# Patient Record
Sex: MALE | Race: OTHER
[De-identification: names, ages, dates, MRNs, and addresses within clinical notes are randomized per-mention and may not be internally consistent; named-entity substitution may affect disease eponyms.]

---

## 2017-03-20 ENCOUNTER — HOSPITAL ENCOUNTER (EMERGENCY)
Dept: HOSPITAL 62 - ER | Age: 82
Discharge: HOME | End: 2017-03-20
Payer: MEDICARE

## 2017-03-20 VITALS — SYSTOLIC BLOOD PRESSURE: 148 MMHG | DIASTOLIC BLOOD PRESSURE: 58 MMHG

## 2017-03-20 DIAGNOSIS — M79.603: ICD-10-CM

## 2017-03-20 DIAGNOSIS — M25.559: ICD-10-CM

## 2017-03-20 DIAGNOSIS — K56.41: Primary | ICD-10-CM

## 2017-03-20 DIAGNOSIS — N18.4: ICD-10-CM

## 2017-03-20 DIAGNOSIS — K59.00: ICD-10-CM

## 2017-03-20 LAB
ALBUMIN SERPL-MCNC: 4.2 G/DL (ref 3.5–5)
ALP SERPL-CCNC: 103 U/L (ref 38–126)
ALT SERPL-CCNC: 27 U/L (ref 21–72)
ANION GAP SERPL CALC-SCNC: 25 MMOL/L (ref 5–19)
AST SERPL-CCNC: 22 U/L (ref 17–59)
BASOPHILS # BLD AUTO: 0.1 10^3/UL (ref 0–0.2)
BASOPHILS NFR BLD AUTO: 0.6 % (ref 0–2)
BILIRUB DIRECT SERPL-MCNC: 0.6 MG/DL (ref 0–0.4)
BILIRUB SERPL-MCNC: 0.6 MG/DL (ref 0.2–1.3)
BUN SERPL-MCNC: 145 MG/DL (ref 7–20)
CALCIUM: 8 MG/DL (ref 8.4–10.2)
CHLORIDE SERPL-SCNC: 96 MMOL/L (ref 98–107)
CO2 SERPL-SCNC: 18 MMOL/L (ref 22–30)
CREAT SERPL-MCNC: 13.28 MG/DL (ref 0.52–1.25)
EOSINOPHIL # BLD AUTO: 0 10^3/UL (ref 0–0.6)
EOSINOPHIL NFR BLD AUTO: 0.4 % (ref 0–6)
ERYTHROCYTE [DISTWIDTH] IN BLOOD BY AUTOMATED COUNT: 14 % (ref 11.5–14)
GLUCOSE SERPL-MCNC: 284 MG/DL (ref 75–110)
HCT VFR BLD CALC: 35.3 % (ref 37.9–51)
HGB BLD-MCNC: 11.9 G/DL (ref 13.5–17)
HGB HCT DIFFERENCE: 0.4
LIPASE SERPL-CCNC: 1484.3 U/L (ref 23–300)
LYMPHOCYTES # BLD AUTO: 0.8 10^3/UL (ref 0.5–4.7)
LYMPHOCYTES NFR BLD AUTO: 6.1 % (ref 13–45)
MCH RBC QN AUTO: 31 PG (ref 27–33.4)
MCHC RBC AUTO-ENTMCNC: 33.5 G/DL (ref 32–36)
MCV RBC AUTO: 92 FL (ref 80–97)
MONOCYTES # BLD AUTO: 1 10^3/UL (ref 0.1–1.4)
MONOCYTES NFR BLD AUTO: 7 % (ref 3–13)
NEUTROPHILS # BLD AUTO: 11.7 10^3/UL (ref 1.7–8.2)
NEUTS SEG NFR BLD AUTO: 85.9 % (ref 42–78)
POTASSIUM SERPL-SCNC: 5.6 MMOL/L (ref 3.6–5)
PROT SERPL-MCNC: 7.2 G/DL (ref 6.3–8.2)
RBC # BLD AUTO: 3.82 10^6/UL (ref 4.35–5.55)
SODIUM SERPL-SCNC: 138.6 MMOL/L (ref 137–145)
WBC # BLD AUTO: 13.6 10^3/UL (ref 4–10.5)

## 2017-03-20 PROCEDURE — 83690 ASSAY OF LIPASE: CPT

## 2017-03-20 PROCEDURE — 74022 RADEX COMPL AQT ABD SERIES: CPT

## 2017-03-20 PROCEDURE — 80053 COMPREHEN METABOLIC PANEL: CPT

## 2017-03-20 PROCEDURE — 85025 COMPLETE CBC W/AUTO DIFF WBC: CPT

## 2017-03-20 PROCEDURE — 36415 COLL VENOUS BLD VENIPUNCTURE: CPT

## 2017-03-20 PROCEDURE — 99284 EMERGENCY DEPT VISIT MOD MDM: CPT

## 2017-03-20 NOTE — ER DOCUMENT REPORT
ED General





- General


Chief Complaint: Constipation


Stated Complaint: ARM PAIN, HIP PAIN


Mode of Arrival: Medic


Information source: Patient


Notes: 


88 y old male hx of ckd, presents with complaints of constipation , decreased 

urine out put since december. Pt notes that he is on stool softner


TRAVEL OUTSIDE OF THE [place] IN LAST 30 DAYS: No





- HPI


Onset: Other - 3 month duration


Onset/Duration: Persistent


Quality of pain: Achy


Severity: Mild


Pain Level: 1


Associated symptoms: Other


Exacerbated by: Denies


Relieved by: Denies


Similar symptoms previously: Yes


Recently seen / treated by doctor: Yes





- Related Data


Allergies/Adverse Reactions: 


 





No Known Allergies Allergy (Verified 03/20/17 09:42)


 











Past Medical History





- Social History


Smoking Status: Never Smoker


Cigarette use (# per day): No


Chew tobacco use (# tins/day): No


Smoking Education Provided: No


Frequency of alcohol use: None


Drug Abuse: None


Family History: Reviewed & Not Pertinent


Patient has suicidal ideation: No


Patient has homicidal ideation: No





- Past Medical History


Cardiac Medical History: Reports: Hx Congestive Heart Failure, Hx 

Hypercholesterolemia, Hx Hypertension


   Denies: Hx Heart Attack


Pulmonary Medical History: 


   Denies: Hx Asthma, Hx Tuberculosis


Neurological Medical History: Denies: Hx Cerebrovascular Accident, Hx Seizures


Endocrine Medical History: Reports: Hx Diabetes Mellitus Type 2


Renal/ Medical History: Reports: Hx End Stage Renal Disease.  Denies: Hx 

Peritoneal Dialysis


GI Medical History: Denies: Hx Hepatitis, Hx Hiatal Hernia, Hx Ulcer


Infectious Medical History: Denies: Hx Hepatitis


Past Surgical History: Reports: Hx Oral Surgery - left jaw, Hx Orthopedic 

Surgery - Jaw.  Denies: Hx Open Heart Surgery, Hx Pacemaker





- Immunizations


Hx Diphtheria, Pertussis, Tetanus Vaccination: No


Hx Pneumococcal Vaccination: 01/01/11





Review of Systems





- Review of Systems


Notes: 


REVIEW OF SYSTEMS:


CONSTITUTIONAL :  Denies fever,  chills, or sweats.  Denies recent illness.


EENT:   Denies eye, ear, throat, or mouth pain or symptoms.  Denies nasal or 

sinus congestion or discharge.  Denies throat, tongue, or mouth swelling or 

difficulty swallowing.


CARDIOVASCULAR:  Denies chest pain.  Denies palpitations or racing or irregular 

heart beat.  Denies ankle edema.


RESPIRATORY:  Denies cough, cold, or chest congestion.  Denies shortness of 

breath, difficulty breathing, or wheezing.


GASTROINTESTINAL: Admits constipation


GENITOURINARY: Admits to decreased urinary output


MUSCULOSKELETAL:  Denies back or neck pain or stiffness.  Denies joint pain or 

swelling.


SKIN:   Denies rash, lesions or sores.


HEMATOLOGIC :   Denies easy bruising or bleeding.


LYMPHATIC:  Denies swollen, enlarged glands.


NEUROLOGICAL:  Denies confusion or altered mental status.  Denies passing out 

or loss of consciousness.  Denies dizziness or lightheadedness.  Denies 

headache.  Denies weakness or paralysis or loss of use of either side.  Denies 

problems with gait or speech.  Denies sensory loss, numbness, or tingling.  

Denies seizures.


PSYCHIATRIC:  Denies anxiety or stress.  Denies depression, suicidal ideation, 

or homicidal ideation.





ALL OTHER SYSTEMS REVIEWED AND NEGATIVE.





Dictation was performed using Dragon voice recognition software 








PHYSICAL EXAMINATION:





GENERAL: Well-appearing, well-nourished and in no acute distress.





HEAD: Atraumatic, normocephalic.





EYES: Pupils equal round and reactive to light, extraocular movements intact, 

sclera anicteric, conjunctiva are normal.





ENT: Nares patent, oropharynx clear without exudates.  Moist mucous membranes.





NECK: Normal range of motion, supple without lymphadenopathy





LUNGS: Breath sounds clear to auscultation bilaterally and equal.  No wheezes 

rales or rhonchi.





HEART: Regular rate and rhythm without murmurs





ABDOMEN: Soft, nontender, nondistended abdomen.  No guarding, no rebound.  No 

masses appreciated.  Very hard stool in rectal vault





Musculoskeletal: Normal range of motion, no pitting or edema.  No cyanosis.





NEUROLOGICAL: Cranial nerves grossly intact.  Normal speech, normal gait.  

Normal sensory, motor exams 





PSYCH: Normal mood, normal affect.





SKIN: Warm, Dry, normal turgor, no rashes or lesions noted.





Physical Exam





- Vital signs


Vitals: 


 











Temp Pulse Resp BP Pulse Ox


 


 98.0 F   65   16   119/50 L  100 


 


 03/20/17 09:36  03/20/17 09:36  03/20/17 09:36  03/20/17 09:36  03/20/17 09:36














Course





- Re-evaluation


Re-evalutation: 


03/20/17 13:06


Patient's nephrologist paged





03/20/17 14:49


Dr kassy pérez patient is stable ot follow up in his office for outpatient 

dialysis. he requests kyaxelate for the kyperkalemia





Fecal decompaction performed, large hard stool removed


pt to be discharged with close follow up 





03/20/17 14:52


After performing a Medical Screening Examination, I estimate there is LOW risk 

for ACUTE APPENDICITIS, BOWEL OBSTRUCTION, ACUTE CHOLECYSTITIS, PERFORATED 

DIVERTICULITIS, INCARCERATED HERNIA, PANCREATITIS, or PERFORATED ULCER, thus I 

consider the discharge disposition reasonable. Also, there is no evidence or 

peritonitis, sepsis, or toxicity. The patient and I have discussed the 

diagnosis and risks, and we agree with discharging home with close follow-up 

with the understanding that symptoms and presentations can change. We also 

discussed returning to the Emergency Department immediately if new or worsening 

symptoms occur. We have discussed the symptoms which are most concerning (e.g., 

bloody stool, fever, changing or worsening pain, intractable vomiting - 

standard verbal up date) that necessitate immediate return.





- Vital Signs


Vital signs: 


 











Temp Pulse Resp BP Pulse Ox


 


 98.0 F   65   16   119/50 L  100 


 


 03/20/17 09:36  03/20/17 09:36  03/20/17 09:36  03/20/17 09:36  03/20/17 09:36














- Laboratory


Result Diagrams: 


 03/20/17 11:30





 03/20/17 11:30


Laboratory results interpreted by me: 


 











  03/20/17 03/20/17





  11:30 11:30


 


WBC  13.6 H 


 


RBC  3.82 L 


 


Hgb  11.9 L 


 


Hct  35.3 L 


 


Seg Neutrophils %  85.9 H 


 


Lymphocytes %  6.1 L 


 


Absolute Neutrophils  11.7 H 


 


Potassium   5.6 H


 


Chloride   96 L


 


Carbon Dioxide   18 L


 


Anion Gap   25 H


 


BUN   145 H


 


Creatinine   13.28 H


 


Est GFR ( Amer)   4 L


 


Est GFR (Non-Af Amer)   4 L


 


Glucose   284 H


 


Calcium   8.0 L


 


Direct Bilirubin   0.6 H


 


Neonat Total Bilirubin   0.0 L


 


Lipase   1484.3 H














- Diagnostic Test


Radiology reviewed: Image reviewed, Reports reviewed





Procedures





- Additional Procedures


  ** fecal decompaction


Time performed: 14:35 - large amount of hard stool removed with no complication 





Discharge





- Discharge


Clinical Impression: 


 Fecal impaction





Constipation


Qualifiers:


 Constipation type: unspecified constipation type Qualified Code(s): K59.00 - 

Constipation, unspecified





CKD (chronic kidney disease)


Qualifiers:


 Chronic kidney disease stage: stage 4 (severe) Qualified Code(s): N18.4 - 

Chronic kidney disease, stage 4 (severe)





Condition: Stable


Disposition: HOME, SELF-CARE


Instructions:  Fecal Impaction ([facility])


Prescriptions: 


Sodium Polystyrene Sulfonate [Kayexalate 15 Gm/60 Ml Susp 60 Ml] 15 gm PO BID #

1 bottle


Referrals: 


LEANDRO SU MD [Primary Care Provider] - Follow up as needed


LUZ MARINA GARCIA MD [ACTIVE STAFF] - Follow up tomorrow

## 2017-03-31 ENCOUNTER — HOSPITAL ENCOUNTER (OUTPATIENT)
Dept: HOSPITAL 62 - OD | Age: 82
End: 2017-03-31
Attending: FAMILY MEDICINE
Payer: MEDICARE

## 2017-03-31 DIAGNOSIS — N18.9: Primary | ICD-10-CM

## 2017-03-31 LAB
ANION GAP SERPL CALC-SCNC: 21 MMOL/L (ref 5–19)
BASOPHILS # BLD AUTO: 0.1 10^3/UL (ref 0–0.2)
BASOPHILS NFR BLD AUTO: 1 % (ref 0–2)
BUN SERPL-MCNC: 129 MG/DL (ref 7–20)
CALCIUM: 8.3 MG/DL (ref 8.4–10.2)
CHLORIDE SERPL-SCNC: 102 MMOL/L (ref 98–107)
CO2 SERPL-SCNC: 20 MMOL/L (ref 22–30)
CREAT SERPL-MCNC: 9.35 MG/DL (ref 0.52–1.25)
EOSINOPHIL # BLD AUTO: 0.2 10^3/UL (ref 0–0.6)
EOSINOPHIL NFR BLD AUTO: 2 % (ref 0–6)
ERYTHROCYTE [DISTWIDTH] IN BLOOD BY AUTOMATED COUNT: 13.7 % (ref 11.5–14)
GLUCOSE SERPL-MCNC: 148 MG/DL (ref 75–110)
HCT VFR BLD CALC: 29.1 % (ref 37.9–51)
HGB BLD-MCNC: 9.9 G/DL (ref 13.5–17)
HGB HCT DIFFERENCE: 0.6
LYMPHOCYTES # BLD AUTO: 1 10^3/UL (ref 0.5–4.7)
LYMPHOCYTES NFR BLD AUTO: 12.1 % (ref 13–45)
MCH RBC QN AUTO: 31.6 PG (ref 27–33.4)
MCHC RBC AUTO-ENTMCNC: 34 G/DL (ref 32–36)
MCV RBC AUTO: 93 FL (ref 80–97)
MONOCYTES # BLD AUTO: 0.8 10^3/UL (ref 0.1–1.4)
MONOCYTES NFR BLD AUTO: 9.2 % (ref 3–13)
NEUTROPHILS # BLD AUTO: 6.3 10^3/UL (ref 1.7–8.2)
NEUTS SEG NFR BLD AUTO: 75.7 % (ref 42–78)
POTASSIUM SERPL-SCNC: 3.9 MMOL/L (ref 3.6–5)
RBC # BLD AUTO: 3.13 10^6/UL (ref 4.35–5.55)
SODIUM SERPL-SCNC: 142.6 MMOL/L (ref 137–145)
WBC # BLD AUTO: 8.3 10^3/UL (ref 4–10.5)

## 2017-03-31 PROCEDURE — 85025 COMPLETE CBC W/AUTO DIFF WBC: CPT

## 2017-03-31 PROCEDURE — 36415 COLL VENOUS BLD VENIPUNCTURE: CPT

## 2017-03-31 PROCEDURE — 80048 BASIC METABOLIC PNL TOTAL CA: CPT

## 2017-04-03 ENCOUNTER — HOSPITAL ENCOUNTER (OUTPATIENT)
Dept: HOSPITAL 62 - OD | Age: 82
End: 2017-04-03
Attending: INTERNAL MEDICINE
Payer: MEDICARE

## 2017-04-03 DIAGNOSIS — N18.5: ICD-10-CM

## 2017-04-03 DIAGNOSIS — Z11.59: Primary | ICD-10-CM

## 2017-04-03 DIAGNOSIS — R80.9: ICD-10-CM

## 2017-04-03 DIAGNOSIS — E87.5: ICD-10-CM

## 2017-04-03 DIAGNOSIS — E11.9: ICD-10-CM

## 2017-04-03 LAB
ANION GAP SERPL CALC-SCNC: 20 MMOL/L (ref 5–19)
BUN SERPL-MCNC: 118 MG/DL (ref 7–20)
CALCIUM: 8.4 MG/DL (ref 8.4–10.2)
CHLORIDE SERPL-SCNC: 104 MMOL/L (ref 98–107)
CO2 SERPL-SCNC: 21 MMOL/L (ref 22–30)
CREAT SERPL-MCNC: 8.78 MG/DL (ref 0.52–1.25)
GLUCOSE SERPL-MCNC: 120 MG/DL (ref 75–110)
POTASSIUM SERPL-SCNC: 4 MMOL/L (ref 3.6–5)
SODIUM SERPL-SCNC: 145.1 MMOL/L (ref 137–145)

## 2017-04-03 PROCEDURE — 36415 COLL VENOUS BLD VENIPUNCTURE: CPT

## 2017-04-03 PROCEDURE — 80048 BASIC METABOLIC PNL TOTAL CA: CPT

## 2017-04-03 PROCEDURE — 86803 HEPATITIS C AB TEST: CPT

## 2017-04-03 PROCEDURE — 87340 HEPATITIS B SURFACE AG IA: CPT

## 2017-04-03 PROCEDURE — 86704 HEP B CORE ANTIBODY TOTAL: CPT

## 2017-04-03 PROCEDURE — 86804 HEP C AB TEST CONFIRM: CPT

## 2017-04-05 LAB — HCV AB SER IA-ACNC: <0.1 S/CO RATIO (ref 0–0.9)

## 2017-04-14 ENCOUNTER — HOSPITAL ENCOUNTER (OUTPATIENT)
Dept: HOSPITAL 62 - 5 | Age: 82
Setting detail: OBSERVATION
LOS: 1 days | Discharge: HOME | End: 2017-04-15
Attending: SURGERY | Admitting: SURGERY
Payer: MEDICARE

## 2017-04-14 DIAGNOSIS — E11.22: ICD-10-CM

## 2017-04-14 DIAGNOSIS — I13.2: ICD-10-CM

## 2017-04-14 DIAGNOSIS — T82.858A: ICD-10-CM

## 2017-04-14 DIAGNOSIS — Z99.2: ICD-10-CM

## 2017-04-14 DIAGNOSIS — N18.6: ICD-10-CM

## 2017-04-14 DIAGNOSIS — Z79.899: ICD-10-CM

## 2017-04-14 DIAGNOSIS — Z79.82: ICD-10-CM

## 2017-04-14 DIAGNOSIS — T82.818A: Primary | ICD-10-CM

## 2017-04-14 DIAGNOSIS — I50.9: ICD-10-CM

## 2017-04-14 LAB
ANION GAP SERPL CALC-SCNC: 13 MMOL/L (ref 5–19)
BASOPHILS # BLD AUTO: 0.1 10^3/UL (ref 0–0.2)
BASOPHILS NFR BLD AUTO: 0.9 % (ref 0–2)
BUN SERPL-MCNC: 72 MG/DL (ref 7–20)
CALCIUM: 7.4 MG/DL (ref 8.4–10.2)
CHLORIDE SERPL-SCNC: 105 MMOL/L (ref 98–107)
CO2 SERPL-SCNC: 25 MMOL/L (ref 22–30)
CREAT SERPL-MCNC: 5.75 MG/DL (ref 0.52–1.25)
EOSINOPHIL # BLD AUTO: 0.2 10^3/UL (ref 0–0.6)
EOSINOPHIL NFR BLD AUTO: 3.4 % (ref 0–6)
ERYTHROCYTE [DISTWIDTH] IN BLOOD BY AUTOMATED COUNT: 13.9 % (ref 11.5–14)
GLUCOSE SERPL-MCNC: 120 MG/DL (ref 75–110)
HCT VFR BLD CALC: 24.8 % (ref 37.9–51)
HGB BLD-MCNC: 8.4 G/DL (ref 13.5–17)
HGB HCT DIFFERENCE: 0.4
LYMPHOCYTES # BLD AUTO: 1 10^3/UL (ref 0.5–4.7)
LYMPHOCYTES NFR BLD AUTO: 16.2 % (ref 13–45)
MCH RBC QN AUTO: 32 PG (ref 27–33.4)
MCHC RBC AUTO-ENTMCNC: 33.9 G/DL (ref 32–36)
MCV RBC AUTO: 95 FL (ref 80–97)
MONOCYTES # BLD AUTO: 0.8 10^3/UL (ref 0.1–1.4)
MONOCYTES NFR BLD AUTO: 12.5 % (ref 3–13)
NEUTROPHILS # BLD AUTO: 4.3 10^3/UL (ref 1.7–8.2)
NEUTS SEG NFR BLD AUTO: 67 % (ref 42–78)
POTASSIUM SERPL-SCNC: 3.9 MMOL/L (ref 3.6–5)
RBC # BLD AUTO: 2.62 10^6/UL (ref 4.35–5.55)
SODIUM SERPL-SCNC: 143.4 MMOL/L (ref 137–145)
WBC # BLD AUTO: 6.4 10^3/UL (ref 4–10.5)

## 2017-04-14 PROCEDURE — C1752 CATH,HEMODIALYSIS,SHORT-TERM: HCPCS

## 2017-04-14 PROCEDURE — C1894 INTRO/SHEATH, NON-LASER: HCPCS

## 2017-04-14 PROCEDURE — 36558 INSERT TUNNELED CV CATH: CPT

## 2017-04-14 PROCEDURE — 73060 X-RAY EXAM OF HUMERUS: CPT

## 2017-04-14 PROCEDURE — 82962 GLUCOSE BLOOD TEST: CPT

## 2017-04-14 PROCEDURE — 76937 US GUIDE VASCULAR ACCESS: CPT

## 2017-04-14 PROCEDURE — 93005 ELECTROCARDIOGRAM TRACING: CPT

## 2017-04-14 PROCEDURE — 93010 ELECTROCARDIOGRAM REPORT: CPT

## 2017-04-14 PROCEDURE — 85025 COMPLETE CBC W/AUTO DIFF WBC: CPT

## 2017-04-14 PROCEDURE — 36415 COLL VENOUS BLD VENIPUNCTURE: CPT

## 2017-04-14 PROCEDURE — 36902 INTRO CATH DIALYSIS CIRCUIT: CPT

## 2017-04-14 PROCEDURE — 77001 FLUOROGUIDE FOR VEIN DEVICE: CPT

## 2017-04-14 PROCEDURE — 80048 BASIC METABOLIC PNL TOTAL CA: CPT

## 2017-04-14 PROCEDURE — C1769 GUIDE WIRE: HCPCS

## 2017-04-14 PROCEDURE — G0379 DIRECT REFER HOSPITAL OBSERV: HCPCS

## 2017-04-14 PROCEDURE — C1725 CATH, TRANSLUMIN NON-LASER: HCPCS

## 2017-04-14 PROCEDURE — 00532 ANES ACCESS CTR VENOUS CRCJ: CPT

## 2017-04-14 PROCEDURE — G0378 HOSPITAL OBSERVATION PER HR: HCPCS

## 2017-04-14 NOTE — PDOC H&P
General


Chief Complaint: 


The patient is admitted for reestablishment of hemodialysis access.  He has end-

stage renal disease on hemodialysis and his left arm brachiocephalic fistula 

has failed.





- Diagnosis


(1) Dialysis AV fistula malfunction


Is this a Current Diagnosis?: Yes





(2) Diabetes mellitus type II, controlled


Is this a Current Diagnosis?: Yes








(4) History of congestive heart failure


Is this a Current Diagnosis?: Yes





(5) Hypertension


Is this a Current Diagnosis?: Yes








- Current Medications/Allergies


Home Medications: 


 





Amlodipine Besylate [Norvasc 10 mg Tablet] 10 mg PO DAILY 01/02/13 


Doxazosin Mesylate [Cardura] 8 mg PO QHS 01/02/13 


Calcitriol [Rocaltrol 0.25 mcg Capsule] 0.25 mcg PO DAILY 04/22/14 


Glipizide [Glipizide Xl] 10 mg PO DAILY 04/22/14 


Folic Acid/Vitamin B Comp W-C [Nephrocaps Multiple Vitamin Capsule] 1 cap PO 

QHS 09/09/15 


Aspirin 325 mg PO DAILY 04/14/17 


Furosemide [Lasix] 40 mg PO DAILY 04/14/17 


Metoprolol Tartrate [Lopressor 50 mg Tablet] 50 mg PO QHS 04/14/17 








Allergies/Adverse Reactions: 


 





No Known Allergies Allergy (Verified 03/20/17 09:42)


 











Past Medical History


Cardiac Medical History: Reports: Congestive Heart Failure, Hyperlipidema, 

Hypertension


   Denies: Myocardial Infarction


Pulmonary Medical History: 


   Denies: Asthma, Tuberculosis


Neurological Medical History: 


   Denies: Seizures


Endocrine Medical History: Reports: Diabetes Mellitus Type 2


Renal/ Medical History: Reports: End Stage Renal Disease


GI Medical History: 


   Denies: Hepatitis, Hiatal Hernia


Hematology: 


   Denies: Anemia, Sickle Cell Disease





Past Surgical History


Past Surgical History: Reports: Orthopedic Surgery - Jaw


   Denies: Pacemaker





Family History


Family History: Reviewed & Not Pertinent


Parental Family History Reviewed: No


Children Family History Reviewed: No


Sibling(s) Family History Reviewed.: No





Social History


Smoking Status: Never Smoker


Frequency of Alcohol Use: None


Hx Recreational Drug Use: No


Drugs: None


Hx Prescription Drug Abuse: No





Physical Exam


Vital Signs: 


 











Temp Pulse Resp BP Pulse Ox


 


 98 F   66   20   167/62 H  100 


 


 04/14/17 17:16  04/14/17 17:16  04/14/17 17:16  04/14/17 17:16  04/14/17 17:16








 Intake & Output











 04/13/17 04/14/17 04/15/17





 06:59 06:59 06:59


 


Weight   72.1 kg











Additional comments: 


Constitutional: A well-developed well-nourished -American gentleman.  No 

acute distress.





Eyes: Mucous membranes  pink and moist, sclerae anicteric, pupils react 

normally.





Respiratory: No shortness of breath or wheezing.  Breath sounds are normal and 

equal.











Ear nose and throat: Hearing grossly normal.  Teeth intact, tongue normal.





Cardiac: Heart sounds normal, no murmurs, no increased JVP.  Peripheral edema.





Extremities: Upper extremities shows normal range of movement and pulses.  The 

left arm has a cephalic to brachial fistula which is barely palpable 

proximally.  No bruit.  Bruising in the arm noted





Psychiatric: judgment, memory, insight seem normal.  Mood is normal, 

appropriate and pleasant.





Impression/Plan


Impression: 


#1 malfunctioning, clotted arteriovenous fistula left brachiocephalic.





#2 end stage renal disease on hemodialysis.





#3 diabetes mellitus type II.





#4 history of congestive heart failure.





#5 hypertension.


Plan: 


In this patient who is dependent on hemodialysis reestablishment of access is 

important.  The plan is to insert a PermCath catheter and attempted to mature 

the existing fistula by angioplasty.  This would be done first thing in the 

morning with the hope of getting the patient to outpatient hemodialysis later 

on tomorrow.





The procedure was explained to the patient.  He is accepting of the risks, 

benefits, expected outcome, and alternatives.

## 2017-04-15 VITALS — DIASTOLIC BLOOD PRESSURE: 60 MMHG | SYSTOLIC BLOOD PRESSURE: 137 MMHG

## 2017-04-15 PROCEDURE — 03783ZZ DILATION OF LEFT BRACHIAL ARTERY, PERCUTANEOUS APPROACH: ICD-10-PCS | Performed by: SURGERY

## 2017-04-15 PROCEDURE — B244YZZ ULTRASONOGRAPHY OF RIGHT HEART USING OTHER CONTRAST: ICD-10-PCS | Performed by: SURGERY

## 2017-04-15 PROCEDURE — 02H633Z INSERTION OF INFUSION DEVICE INTO RIGHT ATRIUM, PERCUTANEOUS APPROACH: ICD-10-PCS | Performed by: SURGERY

## 2017-04-15 NOTE — OPERATIVE REPORT
Operative Report


DATE OF SURGERY: 04/15/17


PREOPERATIVE DIAGNOSIS: #1 clotted arteriovenous fistula left arm rate cephalic 

brachial.  #2 end-stage renal disease on hemodialysis.  #3 mellitus type II.  #

4 history of congestive heart failure.  #5 hypertension


POSTOPERATIVE DIAGNOSIS: #1 clotted arteriovenous fistula left arm rate 

cephalic brachial.  #2 end-stage renal disease on hemodialysis.  #3 mellitus 

type II.  #4 history of congestive heart failure.  #5 hypertension


OPERATION: #1 ultrasound evaluation of the right internal jugular vein.  #2 

insertion of permacatheter via right internal jugular vein under real-time 

ultrasound access.  #3 ultrasound guided needle access into left  arm 

arteriovenous fistula.  #4 angioplasty in central system, venous.  #5 

angioplasty in arteriovenous fistula.  #6 angiogram and interpretation


SURGEON: LENNOX WILLIAMS 1ST ASSISTANT: none


ANESTHESIA: LMAC


TISSUE REMOVED OR ALTERED: N/A.


COMPLICATIONS: 


None


ESTIMATED BLOOD LOSS: 20 mL.


INTRAOPERATIVE FINDINGS: Of a satisfactory right internal jugular vein to 

support permacatheter.  Perm catheter tip well down in the right atrial pool.  

Satisfactory flow of contrast through the right atrium, ventricle and pulmonary 

outflow tract.  Hardcopy documentation done.  The left arm AV fistula and 

pulsation for about 3 cm.  It appears to be a cephalic to brachial transposed.  

Completely clotted above that level.  Stenosis inferred at the subclavian to 

cephalic junction waist demonstrated an eliminated on angioplasty.  Estimated 

to be 80%, eliminated by angioplasty.  Several stenoses in the followed fistula 

as well as arm were addressed by angioplasty.  After successful angioplasty 

there was sweet flow of contrast through the fistula with apparent correction 

of the stenoses.  Estimated to be 70% stenosis initially, resolved by 

angioplasty.


PROCEDURE: 





PROCEDURE:


After verifying the procedure and having obtained informed consent, the


patient's left arm was prepared with Chlorhexidine and draped out with


sterile linen.  Local anesthesia infiltrated.  Percutaneous access into


the fistula ,[ antegrade], obtained about [2 cm] from the arteriovenous 

anastomosis using a


micro puncture needle followed by micro puncture wire and then a


micro puncture catheter.  This was facilitated by use of ultrasound guidance, 

real-time.  Angiogram demonstrated the aforementioned


findings.  Angioplasty was elected.  A 0.035 Glide wire was inserted, and


over this, a 6 Japanese short introducer was placed, this was followed by a [7-mm

] angioplasty balloon .  Angioplasty was


serially done from the upper fistula down to the introducer.  Hand injecting 

with a 3 mL syringe with an estimated 8-15 dulce pressure.  Held for 30 seconds 

to 3 minutes.]. 


Completion angiogram demonstrated [satisfactory result].  The instrumentation 

was now withdrawn over pressure for 10 minutes dressings applied, procedure 

concluded.








Flouro time: 4 minutes


Exposure: 19.5 MicroGray.





Contrast 50 mL of Isovue-M 300, low osmolality.





DICTATING PHYSICIAN:  LENNOX WILLIAMS, M.D.











cc: WILLIAMS, LENNOX M.D. ([id number])


>>

## 2017-04-15 NOTE — EKG REPORT
SEVERITY:- ABNORMAL ECG -

SINUS RHYTHM

MULTIPLE VENTRICULAR PREMATURE COMPLEXES

BORDERLINE LEFT AXIS DEVIATION

BORDERLINE PROLONGED QT INTERVAL

:

Confirmed by: Maverick Whitfield MD 15-Apr-2017 00:07:21

## 2017-04-15 NOTE — PDOC DISCHARGE SUMMARY
Discharge Summary (SDC)





- Discharge


Final Diagnosis: 


#1 clotted arteriovenous fistula left arm rate cephalic brachial.





#2 end-stage renal disease on hemodialysis.





#3 mellitus type II.





#4 history of congestive heart failure.





#5 hypertension.


Date of Surgery: 04/15/17


Discharge Date: 04/15/17


Condition: Fair


Treatment or Instructions: 


#1 activities within moderation encouraged.





#2 follow up in my office by appointment in about 1 week.  Call for appointment.





#3 the wounds covered clean and dry until office visit.





#4 hold off on school/work until evaluation in office.





#5 may shower in 48 hours, keep operated area as dry as possible.





#6 discharge from ambulatory  when ASU criteria met.  2 hemodialysis.





#7 medications per medication reconciliation sheet.





Discharge Diet: Other (Comments) - Renal diabetic


Respiratory Treatments at Home: Deep Breathing/Coughing


Discharge Activity: Activity As Tolerated


Report the Following to Your Physician Immediately: Shortness of Breath, 

Unusual Bleeding

## 2017-07-10 ENCOUNTER — HOSPITAL ENCOUNTER (EMERGENCY)
Dept: HOSPITAL 62 - ER | Age: 82
Discharge: ANOTHER HOSPITAL | End: 2017-07-10
Payer: MEDICARE

## 2017-07-10 VITALS — SYSTOLIC BLOOD PRESSURE: 129 MMHG | DIASTOLIC BLOOD PRESSURE: 51 MMHG

## 2017-07-10 DIAGNOSIS — M25.552: ICD-10-CM
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
## 2017-07-10 DIAGNOSIS — T82.898A: ICD-10-CM

## 2017-07-10 DIAGNOSIS — S06.5X9A: Primary | ICD-10-CM

## 2017-07-10 LAB
ALBUMIN SERPL-MCNC: 3.4 G/DL (ref 3.5–5)
ALP SERPL-CCNC: 74 U/L (ref 38–126)
ALT SERPL-CCNC: 21 U/L (ref 21–72)
ANION GAP SERPL CALC-SCNC: 16 MMOL/L (ref 5–19)
AST SERPL-CCNC: 24 U/L (ref 17–59)
BASOPHILS # BLD AUTO: 0.1 10^3/UL (ref 0–0.2)
BASOPHILS NFR BLD AUTO: 1.3 % (ref 0–2)
BILIRUB DIRECT SERPL-MCNC: 0.8 MG/DL (ref 0–0.4)
BILIRUB SERPL-MCNC: 0.8 MG/DL (ref 0.2–1.3)
BUN SERPL-MCNC: 54 MG/DL (ref 7–20)
CALCIUM: 7.3 MG/DL (ref 8.4–10.2)
CHLORIDE SERPL-SCNC: 105 MMOL/L (ref 98–107)
CO2 SERPL-SCNC: 23 MMOL/L (ref 22–30)
CREAT SERPL-MCNC: 7.62 MG/DL (ref 0.52–1.25)
EOSINOPHIL # BLD AUTO: 0.1 10^3/UL (ref 0–0.6)
EOSINOPHIL NFR BLD AUTO: 0.8 % (ref 0–6)
ERYTHROCYTE [DISTWIDTH] IN BLOOD BY AUTOMATED COUNT: 15.8 % (ref 11.5–14)
GLUCOSE SERPL-MCNC: 154 MG/DL (ref 75–110)
HCT VFR BLD CALC: 32.7 % (ref 37.9–51)
HGB BLD-MCNC: 10.6 G/DL (ref 13.5–17)
HGB HCT DIFFERENCE: -0.9
LYMPHOCYTES # BLD AUTO: 0.9 10^3/UL (ref 0.5–4.7)
LYMPHOCYTES NFR BLD AUTO: 12.1 % (ref 13–45)
MCH RBC QN AUTO: 31.9 PG (ref 27–33.4)
MCHC RBC AUTO-ENTMCNC: 32.4 G/DL (ref 32–36)
MCV RBC AUTO: 99 FL (ref 80–97)
MONOCYTES # BLD AUTO: 0.7 10^3/UL (ref 0.1–1.4)
MONOCYTES NFR BLD AUTO: 9.3 % (ref 3–13)
NEUTROPHILS # BLD AUTO: 5.8 10^3/UL (ref 1.7–8.2)
NEUTS SEG NFR BLD AUTO: 76.5 % (ref 42–78)
POTASSIUM SERPL-SCNC: 5.5 MMOL/L (ref 3.6–5)
PROT SERPL-MCNC: 6.2 G/DL (ref 6.3–8.2)
RBC # BLD AUTO: 3.31 10^6/UL (ref 4.35–5.55)
SODIUM SERPL-SCNC: 143.8 MMOL/L (ref 137–145)
WBC # BLD AUTO: 7.6 10^3/UL (ref 4–10.5)

## 2017-07-10 PROCEDURE — 70450 CT HEAD/BRAIN W/O DYE: CPT

## 2017-07-10 PROCEDURE — 36415 COLL VENOUS BLD VENIPUNCTURE: CPT

## 2017-07-10 PROCEDURE — 80053 COMPREHEN METABOLIC PANEL: CPT

## 2017-07-10 PROCEDURE — 85025 COMPLETE CBC W/AUTO DIFF WBC: CPT

## 2017-07-10 PROCEDURE — 70551 MRI BRAIN STEM W/O DYE: CPT

## 2017-07-10 PROCEDURE — 99291 CRITICAL CARE FIRST HOUR: CPT

## 2017-07-10 NOTE — RADIOLOGY REPORT (SQ)
EXAM DESCRIPTION:  HIP LEFT AP/LATERAL



COMPLETED DATE/TIME:  7/10/2017 10:48 am



REASON FOR STUDY:  left hip



COMPARISON:  Abdominal films 3/20/2017



NUMBER OF VIEWS:  Two views.



TECHNIQUE:  AP pelvis and additional frog-leg view of the left hip.



LIMITATIONS:  None.



FINDINGS:  MINERALIZATION: Osteoporotic

LEFT HIP: No fracture or dislocation.  No worrisome bone lesions.

RIGHT HIP: No fracture or dislocation.  No worrisome bone lesions.

PUBIS AND ISCHIUM: No fracture.

PELVIS: No fracture.  Benign osteochondroma left anterior inferior iliac spine

SACRUM: No fracture or dislocation. No worrisome bone lesions.

LOWER LUMBAR SPINE: Advanced disc space loss of height.

SOFT TISSUES: Very heavily calcified abdominal aorta and iliac vessels.

OTHER: No other significant finding.



IMPRESSION:  No acute displaced fracture.



TECHNICAL DOCUMENTATION:  JOB ID:  [id number]

 2011 [employer]- All Rights Reserved

## 2017-07-10 NOTE — ER DOCUMENT REPORT
ED General





- General


Chief Complaint: Hip Pain


Stated Complaint: LEFT SIDE PAIN


Time Seen by Provider: 07/10/17 10:15


Information source: Patient


Notes: 


88-year-old male who presents with his family with multiple complaints.  

Patient has had some pain to his left hip after a fall in December.  Patient 

had an unremarkable MRI of the hip in March 2017 according to the family.  

Patient states that the pain is in the lateral hip radiating down the back of 

his leg.  He denies any foot drop, incontinence, fevers, or obvious weakness.  

Patient is worse when he moves.  Patient went to dialysis on Thursday but when 

he went Saturday they told him that his left fistula was "clotted".  He has not 

received dialysis since this past Thursday.





Patient denies any shortness of breath or swelling of his legs.





Family also states for the last 3 weeks the patient has had some intermittent 

whole body "shaking".  The patient does not fall or lose consciousness with 

these episodes.  Patient is fully cognizant and speaking during these episodes.

  Patient denies any pain during these episodes.  No obvious weakness or 

numbness.  No headaches or neck pain.  No fevers or vomiting.


TRAVEL OUTSIDE OF THE [place] IN LAST 30 DAYS: No





- HPI


Onset: Other - See above


Onset/Duration: Gradual


Quality of pain: Achy


Severity: Moderate


Pain Level: Denies


Associated symptoms: Other - See above


Exacerbated by: Denies


Relieved by: Denies


Similar symptoms previously: Yes


Recently seen / treated by doctor: Yes





- Related Data


Allergies/Adverse Reactions: 


 





No Known Allergies Allergy (Verified 07/10/17 08:28)


 











Past Medical History





- General


Information source: Patient





- Social History


Smoking Status: Never Smoker


Cigarette use (# per day): No


Chew tobacco use (# tins/day): No


Smoking Education Provided: No


Frequency of alcohol use: None


Drug Abuse: None


Family History: Reviewed & Not Pertinent





- Past Medical History


Cardiac Medical History: Reports: Hx Congestive Heart Failure, Hx 

Hypercholesterolemia, Hx Hypertension


   Denies: Hx Heart Attack


Pulmonary Medical History: 


   Denies: Hx Asthma, Hx Tuberculosis


Neurological Medical History: Denies: Hx Cerebrovascular Accident, Hx Seizures


Endocrine Medical History: Reports: Hx Diabetes Mellitus Type 2


Renal/ Medical History: Reports: Hx End Stage Renal Disease.  Denies: Hx 

Peritoneal Dialysis - hemodialysis


GI Medical History: Denies: Hx Hepatitis, Hx Hiatal Hernia, Hx Ulcer


Infectious Medical History: Denies: Hx Hepatitis


Past Surgical History: Reports: Hx Oral Surgery - left jaw, Hx Orthopedic 

Surgery - Jaw.  Denies: Hx Open Heart Surgery, Hx Pacemaker





- Immunizations


Hx Diphtheria, Pertussis, Tetanus Vaccination: No


Hx Pneumococcal Vaccination: 01/01/11





Review of Systems





- Review of Systems


Constitutional: denies: Fever


EENT: denies: Eye discharge, Nose discharge


Cardiovascular: denies: Chest pain, Palpitations


Respiratory: denies: Short of breath


Gastrointestinal: denies: Vomiting


Genitourinary: denies: Dysuria


Skin: Other - no hives.  denies: Rash


Neurological/Psychological: Other - no slurred speech


-: Yes All other systems reviewed and negative





Physical Exam





- Vital signs


Vitals: 


 











Temp Pulse Resp BP Pulse Ox


 


 97.5 F   58 L  18   140/62 H  97 


 


 07/10/17 08:27  07/10/17 08:27  07/10/17 08:27  07/10/17 08:27  07/10/17 08:27











Notes: 


Reviewed vital signs and nursing note as charted by RN.





CONSTITUTIONAL: Alert and oriented and responds appropriately to questions. Well

-appearing; well-nourished





HEAD: Normocephalic; atraumatic





EYES: PERRL; Conjunctivae clear, sclerae non-icteric





ENT: Normal nose; no rhinorrhea; moist mucous membranes; pharynx without 

lesions noted





NECK: Supple without meningismus; non-tender; no cervical lymphadenopathy, no 

masses





CARD: Regular rate and rhythm; no murmurs, no clicks, no rubs, no gallops; 

symmetric distal pulses





RESP: Normal chest excursion without splinting or tachypnea; breath sounds 

clear and equal bilaterally; no wheezes, rhonchi or rales





ABD/GI: Normal bowel sounds; non-distended; soft, non-tender, no rebound, no 

guarding; no palpable organomegaly or masses





BACK: The back appears normal and is non-tender to palpation





EXT: Normal ROM in all joints; patient has no obvious erythema, induration, or 

swelling to his left hip or back.  Neurovascularly intact distally.  Excellent 

capillary refill and pulses.  Patient has no swelling or induration to the left 

arm fistula.  There is a positive bruit on auscultation





SKIN: Normal color for age and race; warm; dry; good turgor; capillary refill < 

2 seconds; no acute lesions noted





NEURO: CN II through XII are intact.  Moves all extremities equally; Motor and 

sensory function intact





PSYCH: The patient's mood and manner are appropriate. Grooming and personal 

hygiene are appropriate.





Course





- Re-evaluation


Re-evalutation: 


07/10/17 10:52


Given the above history and physical examination, I will order basic labs, 

including a chemistry to check the patient's potassium.  I will also obtain an x

-ray of the left hip as well as a CT scan of the head.  Patient currently has 

no focal neurological deficits.  There is no midline back pain, fluctuance, or 

induration to suggest infection.





The vascular surgeon Dr. Wilburn has only come down to see the patient.  He 

states that he believes he can do a fistula interrogation in the operating room 

at around 1 PM.





07/10/17 11:40


The radiologist called and states that she see something extremely small that 

she wants to make sure is not a subdural.





07/10/17 12:40


Radiologist did call me and stated on the MRI she sees what appears to be a 

very small either acute or chronic subdural, 6 mm in diameter, with no midline 

shift.





07/10/17 13:15


Given the MRI findings I spoke to the vascular surgeon Dr. Wilburn.  He states 

secondary to the needed heparin injections in this patient who requires heparin 

during dialysis, he is concerned about the possible acute versus chronic 

subdural hematoma.  I called the primary care physician who agrees that the 

patient needs transfer to a tertiary center.  I have just spoken to [facility] and awaiting callback.





07/10/17 14:42


I attempted to call [facility] as well as [facility].  Both of them believe they have wait times greater than 12-24 hours.  

I called and spoke to the patient's primary care physician, Dr. Sharon ricks.

  He states that he is not willing to admit the patient at this time and would 

like us to keep the patient in the emergency room.





07/10/17 14:47


I called and spoke to the neurosurgeon. Does not believe needs surgery.  States 

hospitalist to admit.  





- Vital Signs


Vital signs: 


 











Temp Pulse Resp BP Pulse Ox


 


 98.0 F   58 L  18   121/52 L  100 


 


 07/10/17 12:35  07/10/17 08:27  07/10/17 14:01  07/10/17 14:01  07/10/17 14:01














- Laboratory


Result Diagrams: 


 07/10/17 09:36





 07/10/17 09:36


Laboratory results interpreted by me: 


 











  07/10/17 07/10/17





  09:36 09:36


 


RBC  3.31 L 


 


Hgb  10.6 L 


 


Hct  32.7 L 


 


MCV  99 H 


 


RDW  15.8 H 


 


Lymphocytes %  12.1 L 


 


Potassium   5.5 H


 


BUN   54 H


 


Creatinine   7.62 H


 


Est GFR ( Amer)   8 L


 


Est GFR (Non-Af Amer)   7 L


 


Glucose   154 H


 


Calcium   7.3 L


 


Direct Bilirubin   0.8 H


 


Total Protein   6.2 L


 


Albumin   3.4 L














Critical Care Note





- Critical Care Note


Total time excluding time spent on procedures (mins): 35





Discharge





- Discharge


Clinical Impression: 


 Subdural hematoma, Left hip pain, Clotted dialysis shunt





Condition: Fair


Disposition: [facility]


Referrals: 


LEANDRO RICKS MD [Primary Care Provider] - Follow up as needed

## 2017-07-10 NOTE — RADIOLOGY REPORT (SQ)
EXAM DESCRIPTION:  CT HEAD WITHOUT



COMPLETED DATE/TIME:  7/10/2017 11:08 am



REASON FOR STUDY:  20,intermittent shaking episodes



COMPARISON:  None.



TECHNIQUE:  Axial images acquired through the brain without intravenous contrast.  Images reviewed wi
th bone, brain and subdural windows.  Images stored on PACS.

All CT scanners at this facility use dose modulation, iterative reconstruction, and/or weight based d
osing when appropriate to reduce radiation dose to as low as reasonably achievable (ALARA).

[facility]: Dose Right  [facility]: CareDose    [facility]: Dose Right    [facility]: Teradose 4D    [facility]: Smart Technologies



RADIATION DOSE:  Up-to-date CT equipment and radiation dose reduction techniques were employed. CTDIv
ol: 64.6 mGy. DLP: 1034 mGy-cm. mGy.



LIMITATIONS:  Mild motion artifact



FINDINGS:  VENTRICLES: Normal size and contour.

CEREBRUM: No masses.  No hemorrhage.  No midline shift.  Spotty low attenuation in the bifrontal and 
biparietal white matter from chronic small vessel disease.  No evidence for acute infarction.

CEREBELLUM: No masses.  No hemorrhage.  No alteration of density.  No evidence for acute infarction.

EXTRAAXIAL SPACES: There is subtle asymmetric decrease in visibility of sulci over the left parietal 
and posterior frontal region.  Question thin rim isodense subdural collection on axial images 23-27. 
 This report was discussed with Dr. Ambrocio, 1110 hours.  Non contrasted MRI may be useful for followu
p.

ORBITS AND GLOBE: No intra- or extraconal masses.  Normal contour of globe without masses.

CALVARIUM: No fracture.

PARANASAL SINUSES: Minimal fluid in the right sphenoid sinus.

SOFT TISSUES: No mass or hematoma.

OTHER: No other significant finding.



IMPRESSION:  Question thin rim hypodense subdural hemorrhage over the left frontoparietal convexity.



COMMENT:   Pertinent findings on the imaging study reported as a CRITICAL RESULT to CHARLOTTE DEL ANGEL MD at
11:10 on 7/10/2017.

Category of Critical Result: Possible left frontoparietal subdural hemorrhage



TECHNICAL DOCUMENTATION:  JOB ID:  [id number]

Quality ID # 436: Final reports with documentation of one or more dose reduction techniques (e.g., Au
tomated exposure control, adjustment of the mA and/or kV according to patient size, use of iterative 
reconstruction technique)

 2011 [employer]- All Rights Reserved

## 2018-01-02 ENCOUNTER — HOSPITAL ENCOUNTER (OUTPATIENT)
Dept: HOSPITAL 62 - OD | Age: 83
End: 2018-01-02
Attending: INTERNAL MEDICINE
Payer: MEDICARE

## 2018-01-02 DIAGNOSIS — R00.1: Primary | ICD-10-CM

## 2018-01-02 PROCEDURE — 93010 ELECTROCARDIOGRAM REPORT: CPT

## 2018-01-02 PROCEDURE — 93005 ELECTROCARDIOGRAM TRACING: CPT

## 2018-09-11 ENCOUNTER — HOSPITAL ENCOUNTER (EMERGENCY)
Dept: HOSPITAL 62 - ER | Age: 83
LOS: 2 days | Discharge: ANOTHER HOSPITAL | End: 2018-09-13
Payer: MEDICARE

## 2018-09-11 DIAGNOSIS — E11.22: ICD-10-CM

## 2018-09-11 DIAGNOSIS — Z87.891: ICD-10-CM

## 2018-09-11 DIAGNOSIS — R19.7: ICD-10-CM

## 2018-09-11 DIAGNOSIS — I12.0: Primary | ICD-10-CM

## 2018-09-11 DIAGNOSIS — N18.6: ICD-10-CM

## 2018-09-11 DIAGNOSIS — Z99.2: ICD-10-CM

## 2018-09-11 DIAGNOSIS — R53.1: ICD-10-CM

## 2018-09-11 LAB
ADD MANUAL DIFF: NO
ALBUMIN SERPL-MCNC: 3.5 G/DL (ref 3.5–5)
ALP SERPL-CCNC: 66 U/L (ref 38–126)
ALT SERPL-CCNC: 19 U/L (ref 21–72)
ANION GAP SERPL CALC-SCNC: 10 MMOL/L (ref 5–19)
AST SERPL-CCNC: 18 U/L (ref 17–59)
BASOPHILS # BLD AUTO: 0 10^3/UL (ref 0–0.2)
BASOPHILS NFR BLD AUTO: 0.7 % (ref 0–2)
BILIRUB DIRECT SERPL-MCNC: 0.4 MG/DL (ref 0–0.4)
BILIRUB SERPL-MCNC: 1.2 MG/DL (ref 0.2–1.3)
BUN SERPL-MCNC: 15 MG/DL (ref 7–20)
CALCIUM: 8.8 MG/DL (ref 8.4–10.2)
CHLORIDE SERPL-SCNC: 99 MMOL/L (ref 98–107)
CO2 SERPL-SCNC: 28 MMOL/L (ref 22–30)
EOSINOPHIL # BLD AUTO: 0 10^3/UL (ref 0–0.6)
EOSINOPHIL NFR BLD AUTO: 0.2 % (ref 0–6)
ERYTHROCYTE [DISTWIDTH] IN BLOOD BY AUTOMATED COUNT: 15.2 % (ref 11.5–14)
GLUCOSE SERPL-MCNC: 164 MG/DL (ref 75–110)
HCT VFR BLD CALC: 33.7 % (ref 37.9–51)
HGB BLD-MCNC: 11.6 G/DL (ref 13.5–17)
LYMPHOCYTES # BLD AUTO: 0.5 10^3/UL (ref 0.5–4.7)
LYMPHOCYTES NFR BLD AUTO: 8.5 % (ref 13–45)
MCH RBC QN AUTO: 33.6 PG (ref 27–33.4)
MCHC RBC AUTO-ENTMCNC: 34.3 G/DL (ref 32–36)
MCV RBC AUTO: 98 FL (ref 80–97)
MONOCYTES # BLD AUTO: 0.6 10^3/UL (ref 0.1–1.4)
MONOCYTES NFR BLD AUTO: 9 % (ref 3–13)
NEUTROPHILS # BLD AUTO: 5.1 10^3/UL (ref 1.7–8.2)
NEUTS SEG NFR BLD AUTO: 81.6 % (ref 42–78)
PLATELET # BLD: 147 10^3/UL (ref 150–450)
POTASSIUM SERPL-SCNC: 3.3 MMOL/L (ref 3.6–5)
PROT SERPL-MCNC: 6 G/DL (ref 6.3–8.2)
RBC # BLD AUTO: 3.44 10^6/UL (ref 4.35–5.55)
SODIUM SERPL-SCNC: 137.2 MMOL/L (ref 137–145)
TOTAL CELLS COUNTED % (AUTO): 100 %
WBC # BLD AUTO: 6.2 10^3/UL (ref 4–10.5)

## 2018-09-11 PROCEDURE — 96375 TX/PRO/DX INJ NEW DRUG ADDON: CPT

## 2018-09-11 PROCEDURE — 87040 BLOOD CULTURE FOR BACTERIA: CPT

## 2018-09-11 PROCEDURE — 71045 X-RAY EXAM CHEST 1 VIEW: CPT

## 2018-09-11 PROCEDURE — 85025 COMPLETE CBC W/AUTO DIFF WBC: CPT

## 2018-09-11 PROCEDURE — 80053 COMPREHEN METABOLIC PANEL: CPT

## 2018-09-11 PROCEDURE — 93005 ELECTROCARDIOGRAM TRACING: CPT

## 2018-09-11 PROCEDURE — 80048 BASIC METABOLIC PNL TOTAL CA: CPT

## 2018-09-11 PROCEDURE — 81001 URINALYSIS AUTO W/SCOPE: CPT

## 2018-09-11 PROCEDURE — 96365 THER/PROPH/DIAG IV INF INIT: CPT

## 2018-09-11 PROCEDURE — 93010 ELECTROCARDIOGRAM REPORT: CPT

## 2018-09-11 PROCEDURE — 84484 ASSAY OF TROPONIN QUANT: CPT

## 2018-09-11 PROCEDURE — 36415 COLL VENOUS BLD VENIPUNCTURE: CPT

## 2018-09-11 PROCEDURE — 83735 ASSAY OF MAGNESIUM: CPT

## 2018-09-11 PROCEDURE — 99285 EMERGENCY DEPT VISIT HI MDM: CPT

## 2018-09-11 NOTE — RADIOLOGY REPORT (SQ)
XR CHEST 1 VIEW



HISTORY: weakness.



COMPARISON: 9/9/2015



FINDINGS/IMPRESSION:



Normal cardiomediastinal contours. 

Atelectasis at lung bases. Possible right perihilar opacity

versus prominent vasculature.

No large pleural effusion.

Partially visualized left-sided vascular structures
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
## 2018-09-12 LAB
ANION GAP SERPL CALC-SCNC: 9 MMOL/L (ref 5–19)
APPEARANCE UR: CLEAR
APTT PPP: (no result) S
BILIRUB UR QL STRIP: NEGATIVE
BUN SERPL-MCNC: 17 MG/DL (ref 7–20)
CALCIUM: 8.3 MG/DL (ref 8.4–10.2)
CHLORIDE SERPL-SCNC: 100 MMOL/L (ref 98–107)
CO2 SERPL-SCNC: 28 MMOL/L (ref 22–30)
GLUCOSE SERPL-MCNC: 179 MG/DL (ref 75–110)
GLUCOSE UR STRIP-MCNC: NEGATIVE MG/DL
KETONES UR STRIP-MCNC: NEGATIVE MG/DL
NITRITE UR QL STRIP: NEGATIVE
PH UR STRIP: 5 [PH] (ref 5–9)
POTASSIUM SERPL-SCNC: 3 MMOL/L (ref 3.6–5)
PROT UR STRIP-MCNC: 100 MG/DL
SODIUM SERPL-SCNC: 137.1 MMOL/L (ref 137–145)
SP GR UR STRIP: 1.02
UROBILINOGEN UR-MCNC: 2 MG/DL (ref ?–2)

## 2018-09-12 NOTE — EKG REPORT
SEVERITY:- ABNORMAL ECG -

PROBABLE AFIB, EC REPEAT EKG

BORDERLINE LEFT AXIS DEVIATION

ABNORMAL T, CONSIDER ISCHEMIA, LATERAL LEADS

PROLONGED QT INTERVAL

:

Confirmed by: Suha Stinson 12-Sep-2018 08:23:39

## 2018-09-13 VITALS — DIASTOLIC BLOOD PRESSURE: 64 MMHG | SYSTOLIC BLOOD PRESSURE: 146 MMHG

## 2018-09-13 LAB
ADD MANUAL DIFF: NO
ANION GAP SERPL CALC-SCNC: 9 MMOL/L (ref 5–19)
BASOPHILS # BLD AUTO: 0.1 10^3/UL (ref 0–0.2)
BASOPHILS NFR BLD AUTO: 0.8 % (ref 0–2)
BUN SERPL-MCNC: 19 MG/DL (ref 7–20)
CALCIUM: 8.8 MG/DL (ref 8.4–10.2)
CHLORIDE SERPL-SCNC: 99 MMOL/L (ref 98–107)
CO2 SERPL-SCNC: 28 MMOL/L (ref 22–30)
EOSINOPHIL # BLD AUTO: 0.1 10^3/UL (ref 0–0.6)
EOSINOPHIL NFR BLD AUTO: 0.8 % (ref 0–6)
ERYTHROCYTE [DISTWIDTH] IN BLOOD BY AUTOMATED COUNT: 15.1 % (ref 11.5–14)
GLUCOSE SERPL-MCNC: 113 MG/DL (ref 75–110)
HCT VFR BLD CALC: 34 % (ref 37.9–51)
HGB BLD-MCNC: 11.3 G/DL (ref 13.5–17)
LYMPHOCYTES # BLD AUTO: 0.9 10^3/UL (ref 0.5–4.7)
LYMPHOCYTES NFR BLD AUTO: 12.3 % (ref 13–45)
MCH RBC QN AUTO: 32.9 PG (ref 27–33.4)
MCHC RBC AUTO-ENTMCNC: 33.4 G/DL (ref 32–36)
MCV RBC AUTO: 98 FL (ref 80–97)
MONOCYTES # BLD AUTO: 0.8 10^3/UL (ref 0.1–1.4)
MONOCYTES NFR BLD AUTO: 11.1 % (ref 3–13)
NEUTROPHILS # BLD AUTO: 5.3 10^3/UL (ref 1.7–8.2)
NEUTS SEG NFR BLD AUTO: 75 % (ref 42–78)
PLATELET # BLD: 125 10^3/UL (ref 150–450)
POTASSIUM SERPL-SCNC: 3.6 MMOL/L (ref 3.6–5)
RBC # BLD AUTO: 3.45 10^6/UL (ref 4.35–5.55)
SODIUM SERPL-SCNC: 136.3 MMOL/L (ref 137–145)
TOTAL CELLS COUNTED % (AUTO): 100 %
WBC # BLD AUTO: 7.1 10^3/UL (ref 4–10.5)

## 2018-10-20 ENCOUNTER — HOSPITAL ENCOUNTER (INPATIENT)
Dept: HOSPITAL 62 - ER | Age: 83
LOS: 2 days | Discharge: NURSING, REHAB OR LONG-TERM CARE | DRG: 291 | End: 2018-10-22
Attending: FAMILY MEDICINE | Admitting: FAMILY MEDICINE
Payer: MEDICARE

## 2018-10-20 DIAGNOSIS — Z99.2: ICD-10-CM

## 2018-10-20 DIAGNOSIS — F01.50: ICD-10-CM

## 2018-10-20 DIAGNOSIS — I13.2: Primary | ICD-10-CM

## 2018-10-20 DIAGNOSIS — R62.7: ICD-10-CM

## 2018-10-20 DIAGNOSIS — E11.22: ICD-10-CM

## 2018-10-20 DIAGNOSIS — E78.00: ICD-10-CM

## 2018-10-20 DIAGNOSIS — Z66: ICD-10-CM

## 2018-10-20 DIAGNOSIS — R52: ICD-10-CM

## 2018-10-20 DIAGNOSIS — K76.7: ICD-10-CM

## 2018-10-20 DIAGNOSIS — Z51.5: ICD-10-CM

## 2018-10-20 DIAGNOSIS — Z91.81: ICD-10-CM

## 2018-10-20 DIAGNOSIS — N18.6: ICD-10-CM

## 2018-10-20 DIAGNOSIS — I50.9: ICD-10-CM

## 2018-10-20 LAB
ADD MANUAL DIFF: NO
ALBUMIN SERPL-MCNC: 4.1 G/DL (ref 3.5–5)
ALP SERPL-CCNC: 262 U/L (ref 38–126)
ALT SERPL-CCNC: 261 U/L (ref 21–72)
ANION GAP SERPL CALC-SCNC: 12 MMOL/L (ref 5–19)
APPEARANCE UR: (no result)
APTT PPP: (no result) S
AST SERPL-CCNC: 230 U/L (ref 17–59)
BASOPHILS # BLD AUTO: 0 10^3/UL (ref 0–0.2)
BASOPHILS NFR BLD AUTO: 0.2 % (ref 0–2)
BILIRUB DIRECT SERPL-MCNC: 1.1 MG/DL (ref 0–0.4)
BILIRUB SERPL-MCNC: 2.9 MG/DL (ref 0.2–1.3)
BILIRUB UR QL STRIP: (no result)
BUN SERPL-MCNC: 16 MG/DL (ref 7–20)
CALCIUM: 9.5 MG/DL (ref 8.4–10.2)
CHLORIDE SERPL-SCNC: 97 MMOL/L (ref 98–107)
CO2 SERPL-SCNC: 28 MMOL/L (ref 22–30)
EOSINOPHIL # BLD AUTO: 0 10^3/UL (ref 0–0.6)
EOSINOPHIL NFR BLD AUTO: 0.2 % (ref 0–6)
ERYTHROCYTE [DISTWIDTH] IN BLOOD BY AUTOMATED COUNT: 16 % (ref 11.5–14)
GLUCOSE SERPL-MCNC: 180 MG/DL (ref 75–110)
GLUCOSE UR STRIP-MCNC: NEGATIVE MG/DL
HCT VFR BLD CALC: 34.9 % (ref 37.9–51)
HGB BLD-MCNC: 11.7 G/DL (ref 13.5–17)
KETONES UR STRIP-MCNC: NEGATIVE MG/DL
LIPASE SERPL-CCNC: 165.9 U/L (ref 23–300)
LYMPHOCYTES # BLD AUTO: 0.8 10^3/UL (ref 0.5–4.7)
LYMPHOCYTES NFR BLD AUTO: 8.7 % (ref 13–45)
MCH RBC QN AUTO: 32.9 PG (ref 27–33.4)
MCHC RBC AUTO-ENTMCNC: 33.4 G/DL (ref 32–36)
MCV RBC AUTO: 99 FL (ref 80–97)
MONOCYTES # BLD AUTO: 0.7 10^3/UL (ref 0.1–1.4)
MONOCYTES NFR BLD AUTO: 8.1 % (ref 3–13)
NEUTROPHILS # BLD AUTO: 7.6 10^3/UL (ref 1.7–8.2)
NEUTS SEG NFR BLD AUTO: 82.8 % (ref 42–78)
NITRITE UR QL STRIP: NEGATIVE
PH UR STRIP: 5 [PH] (ref 5–9)
PLATELET # BLD: 90 10^3/UL (ref 150–450)
POTASSIUM SERPL-SCNC: 4.6 MMOL/L (ref 3.6–5)
PROT SERPL-MCNC: 6.8 G/DL (ref 6.3–8.2)
PROT UR STRIP-MCNC: 100 MG/DL
RBC # BLD AUTO: 3.54 10^6/UL (ref 4.35–5.55)
SODIUM SERPL-SCNC: 137 MMOL/L (ref 137–145)
SP GR UR STRIP: 1.02
TOTAL CELLS COUNTED % (AUTO): 100 %
UROBILINOGEN UR-MCNC: 4 MG/DL (ref ?–2)
WBC # BLD AUTO: 9.2 10^3/UL (ref 4–10.5)

## 2018-10-20 PROCEDURE — 83880 ASSAY OF NATRIURETIC PEPTIDE: CPT

## 2018-10-20 PROCEDURE — 83690 ASSAY OF LIPASE: CPT

## 2018-10-20 PROCEDURE — 80053 COMPREHEN METABOLIC PANEL: CPT

## 2018-10-20 PROCEDURE — 96374 THER/PROPH/DIAG INJ IV PUSH: CPT

## 2018-10-20 PROCEDURE — 70450 CT HEAD/BRAIN W/O DYE: CPT

## 2018-10-20 PROCEDURE — 51701 INSERT BLADDER CATHETER: CPT

## 2018-10-20 PROCEDURE — 81001 URINALYSIS AUTO W/SCOPE: CPT

## 2018-10-20 PROCEDURE — 71045 X-RAY EXAM CHEST 1 VIEW: CPT

## 2018-10-20 PROCEDURE — 36415 COLL VENOUS BLD VENIPUNCTURE: CPT

## 2018-10-20 PROCEDURE — 85025 COMPLETE CBC W/AUTO DIFF WBC: CPT

## 2018-10-20 PROCEDURE — 87086 URINE CULTURE/COLONY COUNT: CPT

## 2018-10-20 PROCEDURE — 99285 EMERGENCY DEPT VISIT HI MDM: CPT

## 2018-10-20 PROCEDURE — 74176 CT ABD & PELVIS W/O CONTRAST: CPT

## 2018-10-20 NOTE — RADIOLOGY REPORT (SQ)
CT abdomen and pelvis without contrast



HISTORY: Abdominal pain.



This exam was performed according to our departmental

dose-optimization program which includes automated exposure

control, adjustment of the mA and/or kVp according to patient

size and/or use of iterative reconstruction technique where

applicable.





FINDINGS: Moderate bilateral pleural effusions, right greater

than left.



Liver, spleen, pancreas, gallbladder, adrenal glands and kidneys

are within normal limits. No hydronephrosis or biliary

dilatation.



No dilated loops of bowel to suggest obstruction. No free fluid

or free air. The bladder is unremarkable. Small amount of fluid

layering in the pelvis. No abdominal or pelvic lymphadenopathy.

Abdominal aorta moderately diffusely calcified without aneurysm.



IMPRESSION:



No acute abdominal pathology.



Moderate bilateral pleural effusions.

## 2018-10-20 NOTE — RADIOLOGY REPORT (SQ)
EXAM DESCRIPTION:  CHEST SINGLE VIEW



COMPLETED DATE/TIME:  10/20/2018 8:25 pm



REASON FOR STUDY:  cough



COMPARISON:  9/9/2015



EXAM PARAMETERS:  NUMBER OF VIEWS: One view.

TECHNIQUE: Single frontal radiographic view of the chest acquired.

RADIATION DOSE: NA

LIMITATIONS: None.



FINDINGS:  LUNGS AND PLEURA: Increased perihilar and bibasilar opacities.  Fluid is seen within the r
ight minor fissure.  Small layering pleural effusions may be present.  No pneumothorax.

MEDIASTINUM AND HILAR STRUCTURES: No masses.  Contour normal.

HEART AND VASCULAR STRUCTURES: Mild cardiomegaly with central vascular congestion.

BONES: No acute findings.

HARDWARE: Left axillary vascular stent.

OTHER: No other significant finding.



IMPRESSION:  Constellation of findings suggests early CHF exacerbation.  Infectious etiology is not e
xcluded.



TECHNICAL DOCUMENTATION:  JOB ID:  [id number]

 2011 [employer]- All Rights Reserved



Reading location - IP/workstation name: [email or web address]

## 2018-10-20 NOTE — ER DOCUMENT REPORT
ED General





- General


Chief Complaint: Altered Mental Status


Stated Complaint: FALL ALTERED MENTAL STATUS


Time Seen by Provider: 10/20/18 19:12


Cannot obtain history due to: Dementia


Notes: 





Patient is an 89-year-old male who presents from [facility] with concerns 

of a fall during an attempted transfer.  There is concern from the facility 

that the patient has been increasingly weak over the last several days.  

Patient is profoundly demented, unable to provide meaningful history.  Family 

at bedside states that the patient has been rapidly deteriorating over the last 

several months ever since being hospitalized for several weeks in September.  

History is otherwise limited secondary to the patient's advanced dementia


TRAVEL OUTSIDE OF THE [place] IN LAST 30 DAYS: No





- Related Data


Allergies/Adverse Reactions: 


 





No Known Allergies Allergy (Verified 10/20/18 18:40)


 











Past Medical History





- General


Information source: Relative





- Social History


Smoking Status: Never Smoker


Frequency of alcohol use: None


Drug Abuse: None


Lives with: Nursing Home


Family History: Reviewed & Not Pertinent


Patient has suicidal ideation: No


Patient has homicidal ideation: No





- Past Medical History


Cardiac Medical History: Reports: Hx Congestive Heart Failure, Hx 

Hypercholesterolemia, Hx Hypertension


   Denies: Hx Heart Attack


Pulmonary Medical History: 


   Denies: Hx Asthma, Hx Tuberculosis


Neurological Medical History: Denies: Hx Cerebrovascular Accident, Hx Seizures


Endocrine Medical History: Reports: Hx Diabetes Mellitus Type 2


Renal/ Medical History: Reports: Hx End Stage Renal Disease.  Denies: Hx 

Peritoneal Dialysis


GI Medical History: Denies: Hx Hepatitis, Hx Hiatal Hernia, Hx Ulcer


Psychiatric Medical History: Reports: Hx Depression


Infectious Medical History: Denies: Hx Hepatitis


Past Surgical History: Reports: Hx Oral Surgery - left jaw, Hx Orthopedic 

Surgery - Jaw.  Denies: Hx Open Heart Surgery, Hx Pacemaker





- Immunizations


Hx Diphtheria, Pertussis, Tetanus Vaccination: No


Hx Pneumococcal Vaccination: 01/01/11





Review of Systems





- Review of Systems


Notes: 





Constitutional: Negative for fever.


HENT: Negative for sore throat.


Eyes: Negative for visual changes.


Cardiovascular: Negative for chest pain.


Respiratory: Positive for shortness of breath.


Gastrointestinal: Negative for abdominal pain, vomiting or diarrhea.


Genitourinary: Negative for dysuria.


Musculoskeletal: Negative for back pain.


Skin: Negative for rash.


Neurological: Negative for headaches, weakness or numbness.





10 point ROS negative except as marked above and in HPI.





Physical Exam





- Vital signs


Vitals: 


 











Resp Pulse Ox


 


 23 H  95 


 


 10/20/18 18:36  10/20/18 18:36











Interpretation: Tachycardic, Tachypneic


Notes: 





PHYSICAL EXAMINATION:





GENERAL: Elderly, frail, in mild respiratory distress





HEAD: Atraumatic, normocephalic.





EYES: Pupils equal round and reactive to light, extraocular movements intact, 

sclera anicteric, conjunctiva are normal.





ENT: nares patent, oropharynx clear without exudates.  Moderately dry mucous 

membranes.





NECK: Normal range of motion, supple without lymphadenopathy





LUNGS: Alternating periods of tachypnea with normal respiratory rate.  Crackles 

at the bases bilaterally.





HEART: Irregular regular tachycardia without murmurs





ABDOMEN: Soft, nontender, normoactive bowel sounds.  No guarding, no rebound.  

No masses appreciated.





EXTREMITIES: Normal range of motion, no pitting or edema.  No cyanosis.





NEUROLOGICAL: No focal neurological deficits. Moves all extremities 

spontaneously and on command.





PSYCH: Alert, oriented only to person





SKIN: Warm, Dry, normal turgor, no rashes or lesions noted.





Course





- Re-evaluation


Re-evalutation: 





10/20/18 19:55


Presentation of an elderly, chronically ill and frail man with concerns of 

increased weakness and confusion.  Patient is on dialysis, is chronically ill, 

has had progressively worsening weakness and confusion over the last several 

months to years.  Apparently there is concern that the patient is more weak 

than he normally has been, fell during a transfer today.  He did not sustain 

additional injuries during that time.  Family is concerned that he may have a 

pneumonia or a urinary tract infection.  Will obtain labs, chest x-ray, CT of 

the head given question of possible trauma


10/20/18 23:04


Patient's laboratories show new evidence of liver failure which was not present 

as recently as 2 weeks ago.  Chest x-ray also appears to show a congestive 

pattern despite the patient just having had dialysis today and his BNP is 

markedly elevated over previous assessments.  The patient continues to have an 

irregular, labored breathing pattern.  I have discussed the family at length 

that I believe the patient is approaching the end stages of life and that 

hospice or palliative care is appropriate at this point.  After reviewing the 

patient's overall clinical picture, labs, imaging, family is agreeable to 

comfort measures at this point and transitioning to hospice.  Will discuss with 

Dr. Martinez.


10/20/18 23:31


I discussed with Dr. Martinez who is excepted the patient for admission on comfort 

measures.





- Vital Signs


Vital signs: 


 











Temp Pulse Resp BP Pulse Ox


 


 97.7 F   104 H  23 H  125/76   93 


 


 10/20/18 18:41  10/20/18 18:41  10/21/18 00:21  10/21/18 00:21  10/21/18 00:21














- Laboratory


Result Diagrams: 


 10/20/18 18:36





 10/20/18 18:36


Laboratory results interpreted by me: 


 











  10/20/18 10/20/18 10/20/18





  18:36 18:36 18:36


 


RBC  3.54 L  


 


Hgb  11.7 L  


 


Hct  34.9 L  


 


MCV  99 H  


 


RDW  16.0 H  


 


Plt Count  90 L  


 


Seg Neutrophils %  82.8 H  


 


Lymphocytes %  8.7 L  


 


Chloride   97 L 


 


Creatinine   3.08 H 


 


Est GFR ( Amer)   23 L 


 


Est GFR (Non-Af Amer)   19 L 


 


Glucose   180 H 


 


Total Bilirubin   2.9 H 


 


Direct Bilirubin   1.1 H 


 


AST   230 H 


 


ALT   261 H 


 


Alkaline Phosphatase   262 H 


 


NT-Pro-B Natriuret Pep    772560 H


 


Urine Protein   


 


Urine Blood   


 


Urine Bilirubin   


 


Urine Urobilinogen   


 


Ur Leukocyte Esterase   














  10/20/18





  21:08


 


RBC 


 


Hgb 


 


Hct 


 


MCV 


 


RDW 


 


Plt Count 


 


Seg Neutrophils % 


 


Lymphocytes % 


 


Chloride 


 


Creatinine 


 


Est GFR ( Amer) 


 


Est GFR (Non-Af Amer) 


 


Glucose 


 


Total Bilirubin 


 


Direct Bilirubin 


 


AST 


 


ALT 


 


Alkaline Phosphatase 


 


NT-Pro-B Natriuret Pep 


 


Urine Protein  100 H


 


Urine Blood  MODERATE H


 


Urine Bilirubin  SMALL H


 


Urine Urobilinogen  4.0 H


 


Ur Leukocyte Esterase  MODERATE H














- Diagnostic Test


Radiology reviewed: Image reviewed, Reports reviewed


Radiology results interpreted by me: 





10/21/18 02:41


Chest x-ray: Bibasilar edema


10/21/18 02:41


CT head: No acute intracranial bleed or mass





Discharge





- Discharge


Clinical Impression: 


 Hepatorenal syndrome, Labored breathing





Congestive heart failure


Qualifiers:


 Heart failure type: unspecified Heart failure chronicity: acute on chronic 

Qualified Code(s): I50.9 - Heart failure, unspecified





Condition: Poor


Disposition: ADMITTED AS INPATIENT


Admitting Provider: Martinez


Unit Admitted: Medical Floor

## 2018-10-20 NOTE — RADIOLOGY REPORT (SQ)
EXAM DESCRIPTION:  CT HEAD WITHOUT



COMPLETED DATE/TIME:  10/20/2018 8:32 pm



REASON FOR STUDY:  ams



COMPARISON:  7/10/2017



TECHNIQUE:  Axial images acquired through the brain without intravenous contrast.  Images reviewed wi
th bone, brain and subdural windows.  Additional sagittal and coronal reconstructions were generated.
 Images stored on PACS.

All CT scanners at this facility use dose modulation, iterative reconstruction, and/or weight based d
osing when appropriate to reduce radiation dose to as low as reasonably achievable (ALARA).

[facility]: Dose Right  [facility]: CareDose    [facility]: Dose Right    [facility]: Teradose 4D    [facility]: Smart Technologies



RADIATION DOSE:  CT Rad equipment meets quality standard of care and radiation dose reduction techniq
ues were employed. CTDIvol: 53.2 mGy. DLP: 937 mGy-cm. mGy.



LIMITATIONS:  None.



FINDINGS:  VENTRICLES: Prominent ventricles secondary to involutional atrophy.

CEREBRUM: No masses.  No hemorrhage.  No midline shift.  No evidence for acute infarction. Few scatte
red areas of low density in the white matter most likely chronic small vessel ischemic changes.

CEREBELLUM: No masses.  No hemorrhage.  No alteration of density.  No evidence for acute infarction.

EXTRAAXIAL SPACES: Chronic left convexity subdural hemorrhage.

ORBITS AND GLOBE: No intra- or extraconal masses.  Normal contour of globe without masses.

CALVARIUM: No fracture.

PARANASAL SINUSES: Mucosal thickening and foamy secretions are seen within the sphenoid compartments.
  The paranasal sinuses and mastoid air cells are otherwise clear.

SOFT TISSUES: No mass or hematoma.

OTHER: No other significant finding.



IMPRESSION:  Stable CT appearance of the brain demonstrating chronic microvascular and involutional c
hanges as well as a chronic left convexity subdural hemorrhage.

EVIDENCE OF ACUTE STROKE: NO.



COMMENT:  Quality ID # [id number]: Final reports with documentation of one or more dose reduction techniques
 (e.g., Automated exposure control, adjustment of the mA and/or kV according to patient size, use of 
iterative reconstruction technique)



TECHNICAL DOCUMENTATION:  JOB ID:  [id number]

 2011 [employer]- All Rights Reserved



Reading location - IP/workstation name: [email or web address]

## 2018-10-21 VITALS — DIASTOLIC BLOOD PRESSURE: 71 MMHG | SYSTOLIC BLOOD PRESSURE: 128 MMHG

## 2018-10-21 PROCEDURE — 3E0F73Z INTRODUCTION OF ANTI-INFLAMMATORY INTO RESPIRATORY TRACT, VIA NATURAL OR ARTIFICIAL OPENING: ICD-10-PCS | Performed by: FAMILY MEDICINE

## 2018-10-21 RX ADMIN — MORPHINE SULFATE PRN MG: 10 INJECTION INTRAMUSCULAR; INTRAVENOUS; SUBCUTANEOUS at 11:38

## 2018-10-21 RX ADMIN — FUROSEMIDE SCH MG: 10 INJECTION, SOLUTION INTRAMUSCULAR; INTRAVENOUS at 16:10

## 2018-10-21 RX ADMIN — FUROSEMIDE SCH MG: 10 INJECTION, SOLUTION INTRAMUSCULAR; INTRAVENOUS at 21:44

## 2018-10-21 RX ADMIN — MORPHINE SULFATE PRN MG: 10 INJECTION INTRAMUSCULAR; INTRAVENOUS; SUBCUTANEOUS at 16:07

## 2018-10-21 RX ADMIN — FUROSEMIDE SCH MG: 10 INJECTION, SOLUTION INTRAMUSCULAR; INTRAVENOUS at 05:19

## 2018-10-21 NOTE — PDOC PROGRESS REPORT
Subjective


Progress Note for:: 10/21/18


Subjective:: 





Patient is currently lying in the bed asking for the pain medications


Patient's at this point no family member seen in the room discussed with the 

nursing staff taking care of the patient and suggest that the patient is 

currently still in a DNR/DNI for the most of the family member but again some 

family member was some issue with some other family memberAbout the comfort care


We will try to contact the family member again if they want to proceed with all 

the full management and is to consult the cardiology and nephrology for further 

evaluations again patient still have a very poor prognosis


Reason For Visit: 


ESRD, CHF








Physical Exam


Vital Signs: 


 











Temp Pulse Resp BP Pulse Ox


 


 97.7 F   104 H  31 H  128/71 H  78 L


 


 10/20/18 18:41  10/20/18 18:41  10/21/18 00:30  10/21/18 00:30  10/21/18 00:30








 Intake & Output











 10/20/18 10/21/18 10/22/18





 06:59 06:59 06:59


 


Weight  72.1 kg 











General appearance: PRESENT: no acute distress


Eye exam: PRESENT: PERRLA


Mouth exam: PRESENT: neck supple


Respiratory exam: PRESENT: decreased breath sounds


Cardiovascular exam: PRESENT: +S1, +S2


GI/Abdominal exam: PRESENT: normal bowel sounds, soft


Extremities exam: PRESENT: pedal edema


Neurological exam: PRESENT: alert, altered


Skin exam: PRESENT: dry





Results


Impressions: 


 





Chest X-Ray  10/20/18 19:45


IMPRESSION:  Constellation of findings suggests early CHF exacerbation.  

Infectious etiology is not excluded.


 








Head CT  10/20/18 19:45


IMPRESSION:  Stable CT appearance of the brain demonstrating chronic 

microvascular and involutional changes as well as a chronic left convexity 

subdural hemorrhage.


EVIDENCE OF ACUTE STROKE: NO.


 








Abdomen/Pelvis CT  10/20/18 21:22


IMPRESSION:


 


No acute abdominal pathology.


 


Moderate bilateral pleural effusions.


 














Assessment & Plan





- Diagnosis


(1) ESRD (end stage renal disease)


Is this a current diagnosis for this admission?: Yes   


Plan: 


Will discuss with the patient's family again see whether patient's needs to be 

continues to be proceed with the dialysis or not for comfort care there is no 

point to go for dialysis we will consult the Dr. Tobias Chang








(2) Dementia


Qualifiers: 


   Dementia type: vascular dementia   Dementia behavioral disturbance: without 

behavioral disturbance   Qualified Code(s): F01.50 - Vascular dementia without 

behavioral disturbance   


Is this a current diagnosis for this admission?: Yes   


Plan: 


Worsening's most likely from vascular dementia's








(3) Adult failure to thrive


Is this a current diagnosis for this admission?: Yes   


Plan: 


Discussed with the patient and the family if patient continues to not able to 

eat with progress with the medical disease and underlying dementia's not a very 

good prognosis


Family is not willing to put any PEG tube








(4) Congestive heart failure


Qualifiers: 


   Heart failure type: unspecified   Heart failure chronicity: acute on chronic

   Qualified Code(s): I50.9 - Heart failure, unspecified   


Is this a current diagnosis for this admission?: Yes   


Plan: 


Again we consulted cardiology for the further input with this hepatorenal 

syndrome with the congestive heart failure and may be explained the patient's 

family regarding poor prognosis








(5) Hepatorenal syndrome


Is this a current diagnosis for this admission?: Yes   


Plan: 


Due to the above conditions











(7) Diabetes mellitus type II, controlled


Is this a current diagnosis for this admission?: Yes   





(8) Hypertension


Qualifiers: 


   Hypertension type: essential hypertension   Qualified Code(s): I10 - 

Essential (primary) hypertension   


Is this a current diagnosis for this admission?: Yes   





- Time


Time Spent with patient: 15-24 minutes


Medications reviewed and adjusted accordingly: Yes


Anticipated discharge: Hospice, Other


Within: Other





- Inpatient Certification


Based on my medical assessment, after consideration of the patient's 

comorbidities, presenting symptoms, or acuity I expect that the services needed 

warrant INPATIENT care.: Yes


I certify that my determination is in accordance with my understanding of 

Medicare's requirements for reasonable and necessary INPATIENT services [42 CFR 

412.3e].: Yes


Medical Necessity: Need Close Monitoring Due to Risk of Patient Decompensation


Post Hospital Care: D/C Planner Documentation





- Plan Summary


Plan Summary: 





I think patient is currently on comfort care and DNR but again 1 more time will 

discuss with the patient's family to understand very well about the hospice and 

comfort care

## 2018-10-21 NOTE — PDOC H&P
History of Present Illness


Admission Date/PCP: 


  





  YESSENIA ROCHA MD





Patient complains of: weakness


History of Present Illness: 


MICAELA ATKINS is a 89 year old male


This is a 89-year-old male with end-stage renal disease on hemodialysis history 

of the congestive heart failure and multiple other comorbidity currently living 

in the nursing facility and underlying dementia with getting more weaker and 

weakerAnd patient unable to eat or drink much brought to the emergency 

department from the facility due to the weakness and a breathing difficulty


In the emergency department patient's initial workup noticed patient have 

elevated LFT congestive heart failure end-stage renal disease with progressive 

with the hepatorenal disease


Discussed with the myself in the ER physicians with the family in the emergency 

departments and pretty much family to proceed with the DNR/DNI and comfort care 

and pain medications and admitting in the hospital


And is alert awake but confused patient to denied any chest pain but difficulty 

in breathing





Past Medical History


Cardiac Medical History: Reports: Congestive Heart Failure, Hyperlipidema, 

Hypertension


   Denies: Myocardial Infarction


Pulmonary Medical History: 


   Denies: Asthma, Tuberculosis


Neurological Medical History: 


   Denies: Seizures


Endocrine Medical History: Reports: Diabetes Mellitus Type 2


Renal/ Medical History: Reports: End Stage Renal Disease


GI Medical History: Reports: Gastroesophageal Reflux Disease


   Denies: Hepatitis, Hiatal Hernia


Psychiatric Medical History: Reports: Depression


Hematology: Reports: Anemia


   Denies: Sickle Cell Disease





Past Surgical History


Past Surgical History: Reports: Orthopedic Surgery - Jaw


   Denies: Pacemaker





Social History


Smoking Status: Never Smoker


Frequency of Alcohol Use: None


Hx Recreational Drug Use: No


Drugs: None


Hx Prescription Drug Abuse: No





Family History


Family History: Reviewed & Not Pertinent


Parental Family History Reviewed: Yes


Children Family History Reviewed: Yes


Sibling(s) Family History Reviewed.: Yes





Medication/Allergy


Allergies/Adverse Reactions: 


 





No Known Allergies Allergy (Verified 10/20/18 18:40)


 











Review of Systems


All systems: reviewed and no additional remarkable complaints except as stated


Constitutional: PRESENT: anorexia, fatigue, weakness





Physical Exam


Vital Signs: 


 











Temp Pulse Resp BP Pulse Ox


 


 97.7 F   104 H  27 H  149/70 H  86 L


 


 10/20/18 18:41  10/20/18 18:41  10/20/18 21:31  10/20/18 21:31  10/20/18 21:31








 Intake & Output











 10/19/18 10/20/18 10/21/18





 06:59 06:59 06:59


 


Weight   72.575 kg











General appearance: PRESENT: mild distress, thin


Head exam: PRESENT: atraumatic, normocephalic


Eye exam: PRESENT: conjunctiva pink, EOMI, PERRLA.  ABSENT: scleral icterus


Ear exam: PRESENT: normal external ear exam


Mouth exam: PRESENT: moist, tongue midline


Neck exam: PRESENT: full ROM, JVD.  ABSENT: carotid bruit, lymphadenopathy, 

thyromegaly


Respiratory exam: PRESENT: decreased breath sounds


Cardiovascular exam: PRESENT: RRR.  ABSENT: diastolic murmur, rubs, systolic 

murmur


Pulses: PRESENT: normal dorsalis pedis pul, +2 pedal pulses bilateral


Vascular exam: PRESENT: normal capillary refill


GI/Abdominal exam: PRESENT: normal bowel sounds, soft.  ABSENT: distended, 

guarding, mass, organolmegaly, rebound, tenderness


Rectal exam: PRESENT: deferred


Extremities exam: PRESENT: pedal edema


Neurological exam: PRESENT: alert, altered, awake.  ABSENT: motor sensory 

deficit


Psychiatric exam: PRESENT: appropriate affect, normal mood.  ABSENT: homicidal 

ideation, suicidal ideation


Skin exam: PRESENT: dry, intact, warm.  ABSENT: cyanosis, rash





Results


Laboratory Results: 


 





 10/20/18 18:36 





 10/20/18 18:36 





 











  10/20/18 10/20/18 10/20/18





  18:36 18:36 18:36


 


WBC  9.2  


 


RBC  3.54 L  


 


Hgb  11.7 L  


 


Hct  34.9 L  


 


MCV  99 H  


 


MCH  32.9  


 


MCHC  33.4  


 


RDW  16.0 H  


 


Plt Count  90 L  


 


Seg Neutrophils %  82.8 H  


 


Lymphocytes %  8.7 L  


 


Monocytes %  8.1  


 


Eosinophils %  0.2  


 


Basophils %  0.2  


 


Absolute Neutrophils  7.6  


 


Absolute Lymphocytes  0.8  


 


Absolute Monocytes  0.7  


 


Absolute Eosinophils  0.0  


 


Absolute Basophils  0.0  


 


Sodium   137.0 


 


Potassium   4.6 


 


Chloride   97 L 


 


Carbon Dioxide   28 


 


Anion Gap   12 


 


BUN   16 


 


Creatinine   3.08 H 


 


Est GFR ( Amer)   23 L 


 


Est GFR (Non-Af Amer)   19 L 


 


Glucose   180 H 


 


Calcium   9.5 


 


Total Bilirubin   2.9 H 


 


AST   230 H 


 


ALT   261 H 


 


Alkaline Phosphatase   262 H 


 


Total Protein   6.8 


 


Albumin   4.1 


 


Lipase    165.9


 


Urine Color   


 


Urine Appearance   


 


Urine pH   


 


Ur Specific Gravity   


 


Urine Protein   


 


Urine Glucose (UA)   


 


Urine Ketones   


 


Urine Blood   


 


Urine Nitrite   


 


Ur Leukocyte Esterase   


 


Urine WBC (Auto)   


 


Urine RBC (Auto)   














  10/20/18





  21:08


 


WBC 


 


RBC 


 


Hgb 


 


Hct 


 


MCV 


 


MCH 


 


MCHC 


 


RDW 


 


Plt Count 


 


Seg Neutrophils % 


 


Lymphocytes % 


 


Monocytes % 


 


Eosinophils % 


 


Basophils % 


 


Absolute Neutrophils 


 


Absolute Lymphocytes 


 


Absolute Monocytes 


 


Absolute Eosinophils 


 


Absolute Basophils 


 


Sodium 


 


Potassium 


 


Chloride 


 


Carbon Dioxide 


 


Anion Gap 


 


BUN 


 


Creatinine 


 


Est GFR ( Amer) 


 


Est GFR (Non-Af Amer) 


 


Glucose 


 


Calcium 


 


Total Bilirubin 


 


AST 


 


ALT 


 


Alkaline Phosphatase 


 


Total Protein 


 


Albumin 


 


Lipase 


 


Urine Color  MIKO


 


Urine Appearance  CLOUDY


 


Urine pH  5.0


 


Ur Specific Gravity  1.019


 


Urine Protein  100 H


 


Urine Glucose (UA)  NEGATIVE


 


Urine Ketones  NEGATIVE


 


Urine Blood  MODERATE H


 


Urine Nitrite  NEGATIVE


 


Ur Leukocyte Esterase  MODERATE H


 


Urine WBC (Auto)  15


 


Urine RBC (Auto)  8








 











  10/20/18





  18:36


 


NT-Pro-B Natriuret Pep  301843 H











Impressions: 


 





Chest X-Ray  10/20/18 19:45


IMPRESSION:  Constellation of findings suggests early CHF exacerbation.  

Infectious etiology is not excluded.


 








Head CT  10/20/18 19:45


IMPRESSION:  Stable CT appearance of the brain demonstrating chronic 

microvascular and involutional changes as well as a chronic left convexity 

subdural hemorrhage.


EVIDENCE OF ACUTE STROKE: NO.


 








Abdomen/Pelvis CT  10/20/18 21:22


IMPRESSION:


 


No acute abdominal pathology.


 


Moderate bilateral pleural effusions.


 














Assessment & Plan





- Diagnosis


(1) ESRD (end stage renal disease)


Is this a current diagnosis for this admission?: Yes   


Plan: 


las per discuss with pt family my self and er physican put on comfort care


Again discussed with the family regarding Comfort care








(2) Dementia


Qualifiers: 


   Dementia type: vascular dementia   Dementia behavioral disturbance: without 

behavioral disturbance   Qualified Code(s): F01.50 - Vascular dementia without 

behavioral disturbance   


Is this a current diagnosis for this admission?: Yes   


Plan: 


Worsening's most likely from vascular dementia's








(3) Adult failure to thrive


Is this a current diagnosis for this admission?: Yes   


Plan: 


Discussed with the patient and the family if patient continues to not able to 

eat with progress with the medical disease and underlying dementia's not a very 

good prognosis


Family is not willing to put any PEG tube








(4) Congestive heart failure


Qualifiers: 


   Heart failure type: unspecified   Heart failure chronicity: acute on chronic

   Qualified Code(s): I50.9 - Heart failure, unspecified   


Is this a current diagnosis for this admission?: Yes   


Plan: 


Due to the above conditions currently worsening the conditions patient see a 

Dr. Zelaya as outpatients








(5) Hepatorenal syndrome


Is this a current diagnosis for this admission?: Yes   





(6) Labored breathing


Plan: 


Due to the end-stage renal disease congestive heart failure and hepatorenal 

syndromes








(7) Diabetes mellitus type II, controlled


Is this a current diagnosis for this admission?: Yes   





(8) Hypertension


Qualifiers: 


   Hypertension type: essential hypertension   Qualified Code(s): I10 - 

Essential (primary) hypertension   


Is this a current diagnosis for this admission?: Yes   





- Time


Time Spent: 50 to 70 Minutes


Critical Time spent with patient: 25-34 minutes


Medications reviewed and adjusted accordingly: Yes


Anticipated discharge: SNF





- Inpatient Certification


Based on my medical assessment, after consideration of the patient's 

comorbidities, presenting symptoms, or acuity I expect that the services needed 

warrant INPATIENT care.: Yes


I certify that my determination is in accordance with my understanding of 

Medicare's requirements for reasonable and necessary INPATIENT services [42 CFR 

412.3e].: Yes


Medical Necessity: Need Close Monitoring Due to Risk of Patient Decompensation


Post Hospital Care: D/C Planner Documentation





- Plan Summary


Plan Summary: 





Again we will put the patient's in the medical floor patient have a multiple 

comorbidity with the extreme poor prognosis at this point as per discussed with 

the family myself and the ER physicians pretty much suggest the comfort care

## 2018-10-22 RX ADMIN — MORPHINE SULFATE PRN MG: 10 INJECTION INTRAMUSCULAR; INTRAVENOUS; SUBCUTANEOUS at 08:30

## 2018-10-22 RX ADMIN — FUROSEMIDE SCH: 10 INJECTION, SOLUTION INTRAMUSCULAR; INTRAVENOUS at 13:34

## 2018-10-22 RX ADMIN — MORPHINE SULFATE PRN MG: 10 INJECTION INTRAMUSCULAR; INTRAVENOUS; SUBCUTANEOUS at 15:46

## 2018-10-22 RX ADMIN — FUROSEMIDE SCH MG: 10 INJECTION, SOLUTION INTRAMUSCULAR; INTRAVENOUS at 05:43

## 2018-10-22 NOTE — PDOC PROGRESS REPORT
Subjective


Progress Note for:: 10/22/18


Subjective:: 





Patient is currently laying in the bed in the moning From pain


Again discussed with the patient's to call son who has a power of  for 

the non-medical and discussed with the patient's wife and other family member 

and currently patients on comfort care





Reason For Visit: 


ESRD, CHF








Physical Exam


Vital Signs: 


 











Temp Pulse Resp BP Pulse Ox


 


 97.7 F   67   14   128/71 H  97 


 


 10/20/18 18:41  10/21/18 15:00  10/21/18 15:00  10/21/18 00:30  10/21/18 15:00








 Intake & Output











 10/21/18 10/22/18 10/23/18





 06:59 06:59 06:59


 


Intake Total  222 


 


Balance  222 


 


Weight 72.1 kg 73.2 kg 











General appearance: PRESENT: no acute distress


Eye exam: PRESENT: PERRLA


Mouth exam: PRESENT: neck supple


Respiratory exam: PRESENT: decreased breath sounds


Cardiovascular exam: PRESENT: +S1, +S2


GI/Abdominal exam: PRESENT: normal bowel sounds, soft


Neurological exam: PRESENT: altered





Results


Impressions: 


 





Chest X-Ray  10/20/18 19:45


IMPRESSION:  Constellation of findings suggests early CHF exacerbation.  

Infectious etiology is not excluded.


 








Head CT  10/20/18 19:45


IMPRESSION:  Stable CT appearance of the brain demonstrating chronic 

microvascular and involutional changes as well as a chronic left convexity 

subdural hemorrhage.


EVIDENCE OF ACUTE STROKE: NO.


 








Abdomen/Pelvis CT  10/20/18 21:22


IMPRESSION:


 


No acute abdominal pathology.


 


Moderate bilateral pleural effusions.


 














Assessment & Plan





- Diagnosis


(1) ESRD (end stage renal disease)


Is this a current diagnosis for this admission?: Yes   


Plan: 


As per discussed with the Dr. Chang agree with the patient's comfort care 

management due to the patient hepatorenal syndromes and discussed with the 

patient all family members and including so-called son with the non-medical 

power of  all agree do not want to go for any dialysis and comfort care








(2) Dementia


Qualifiers: 


   Dementia type: vascular dementia   Dementia behavioral disturbance: without 

behavioral disturbance   Qualified Code(s): F01.50 - Vascular dementia without 

behavioral disturbance   


Is this a current diagnosis for this admission?: Yes   


Plan: 


Worsening's most likely from vascular dementia's








(3) Adult failure to thrive


Is this a current diagnosis for this admission?: Yes   


Plan: 


Discussed with the patient and the family if patient continues to not able to 

eat with progress with the medical disease and underlying dementia's not a very 

good prognosis


Family is not willing to put any PEG tube








(4) Congestive heart failure


Qualifiers: 


   Heart failure type: unspecified   Heart failure chronicity: acute on chronic

   Qualified Code(s): I50.9 - Heart failure, unspecified   


Is this a current diagnosis for this admission?: Yes   


Plan: 


Again we consulted cardiology for the further input with this hepatorenal 

syndrome with the congestive heart failure and may be explained the patient's 

family regarding poor prognosis








(5) Hepatorenal syndrome


Is this a current diagnosis for this admission?: Yes   


Plan: 


Due to the above conditions











(7) Diabetes mellitus type II, controlled


Is this a current diagnosis for this admission?: Yes   





(8) Hypertension


Qualifiers: 


   Hypertension type: essential hypertension   Qualified Code(s): I10 - 

Essential (primary) hypertension   


Is this a current diagnosis for this admission?: Yes   





- Time


Time Spent with patient: 25-34 minutes


Medications reviewed and adjusted accordingly: Yes


Anticipated discharge: Hospice


Within: Other





- Inpatient Certification


Based on my medical assessment, after consideration of the patient's 

comorbidities, presenting symptoms, or acuity I expect that the services needed 

warrant INPATIENT care.: Yes


I certify that my determination is in accordance with my understanding of 

Medicare's requirements for reasonable and necessary INPATIENT services [42 CFR 

412.3e].: Yes


Post Hospital Care: D/C Planner Documentation





- Plan Summary


Plan Summary: 





Again very extensive discussions with the family member again today with the 

patient's hospice situations and to comfort care and all agree

## 2018-10-22 NOTE — PDOC TRANSFER SUMMARY
General





- Admit/Disc Date/PCP


Admission Date/Primary Care Provider: 


  10/20/18 23:55





  YESSENIA ROCHA MD





Discharge Date: 10/22/18





- Discharge Diagnosis


(1) ESRD (end stage renal disease)


Is this a current diagnosis for this admission?: Yes   





(2) Dementia


Is this a current diagnosis for this admission?: Yes   





(3) Adult failure to thrive


Is this a current diagnosis for this admission?: Yes   





(4) Congestive heart failure


Is this a current diagnosis for this admission?: Yes   





(5) Hepatorenal syndrome


Is this a current diagnosis for this admission?: Yes   








(7) Diabetes mellitus type II, controlled


Is this a current diagnosis for this admission?: Yes   





(8) Hypertension


Is this a current diagnosis for this admission?: Yes   





- Additional Information


Discharge Diet: As Tolerated


Discharge Activity: Activity As Tolerated


Prescriptions: 


Acetaminophen [Tylenol 325 mg Tablet] 650 mg PO Q4HP PRN #120 tablet


 PRN Reason: 


Furosemide [Lasix 20 mg Tablet] 20 mg PO BID #60 tablet


Morphine Sulfate 20 mg PO Q4 #100 ml


Home Medications: 








Acetaminophen [Tylenol 325 mg Tablet] 650 mg PO Q4HP PRN #120 tablet 10/22/18 


Furosemide [Lasix 20 mg Tablet] 20 mg PO BID #60 tablet 10/22/18 


Morphine Sulfate 20 mg PO Q4 #100 ml 10/22/18 











History of Present Illness


Admission Date/PCP: 


  10/20/18 23:55





  YESSENIA ROCHA MD





History of Present Illness: 


MICAELA ATKINS is a 89 year old male


This is a 89-year-old male with end-stage renal disease on hemodialysis history 

of the congestive heart failure and multiple other comorbidity currently living 

in the nursing facility and underlying dementia with getting more weaker and 

weakerAnd patient unable to eat or drink much brought to the emergency 

department from the facility due to the weakness and a breathing difficulty


In the emergency department patient's initial workup noticed patient have 

elevated LFT congestive heart failure end-stage renal disease with progressive 

with the hepatorenal disease


Discussed with the myself in the ER physicians with the family in the emergency 

departments and pretty much family to proceed with the DNR/DNI and comfort care 

and pain medications and admitting in the hospital


And is alert awake but confused patient to denied any chest pain but difficulty 

in breathing





Hospital Course


Hospital Course: 





This is a 89-year-old male with a significant history of the end-stage renal 

disease congestive heart failure worsening dementia and multiple other 

comorbidity brought to the emergency department because of the difficulty in 

breathing and patient is unable to eat from several months and getting weaker 

and weaker


In the emergency department at this point patient admitting in the hospital and 

discussed with the patient's family with the worsening the patient's conditions 

started developing hepatorenal syndromes with the multiple other comorbidity 

decided to patient's family for the hospice care


Discussed with the patient's nephrology and suggest and agree with the hospice 

care


At this point discussed with the wife and the daughter were extensively 

regarding the patient's current condition all options understand the hospice 

very well in patients discharged to inpatient hospice and stopped all the 

dialysis and pretty much comfort care





Physical Exam


Vital Signs: 


 











Temp Pulse Resp BP Pulse Ox


 


 97.7 F   67   14   128/71 H  97 


 


 10/20/18 18:41  10/21/18 15:00  10/21/18 15:00  10/21/18 00:30  10/21/18 15:00








 Intake & Output











 10/21/18 10/22/18 10/23/18





 06:59 06:59 06:59


 


Intake Total  222 


 


Balance  222 


 


Weight 72.1 kg 73.2 kg 











General appearance: PRESENT: mild distress


Eye exam: PRESENT: PERRLA


Respiratory exam: PRESENT: decreased breath sounds


Cardiovascular exam: PRESENT: +S1, +S2


GI/Abdominal exam: PRESENT: normal bowel sounds, soft


Neurological exam: PRESENT: alert, altered, awake





Results


Impressions: 


 





Chest X-Ray  10/20/18 19:45


IMPRESSION:  Constellation of findings suggests early CHF exacerbation.  

Infectious etiology is not excluded.


 








Head CT  10/20/18 19:45


IMPRESSION:  Stable CT appearance of the brain demonstrating chronic 

microvascular and involutional changes as well as a chronic left convexity 

subdural hemorrhage.


EVIDENCE OF ACUTE STROKE: NO.


 








Abdomen/Pelvis CT  10/20/18 21:22


IMPRESSION:


 


No acute abdominal pathology.


 


Moderate bilateral pleural effusions.


 














Transfer Plan





- Time Spent with Patient


Time spent with patient: Greater than 30 Minutes





Qualifiers





- *


PATIENT BEING DISCHARGED WITH ANY OF THE FOLLOWING DIAGNOSIS: No


VTE patient discharged on overlapping Therapy?: Yes





Plan


Time Spent: Greater than 30 Minutes - Patient is discharged to inpatient 

hospice facility

## 2019-09-10 NOTE — RADIOLOGY REPORT (SQ)
EXAM DESCRIPTION:  MRI HEAD WITHOUT



COMPLETED DATE/TIME:  7/10/2017 12:18 pm



REASON FOR STUDY:  20, eval for small subdural?



COMPARISON:  CT brain 7/1/2017



TECHNIQUE:  Multiplanar imaging includes non-contrasted T1, T2, FLAIR, and diffusion with ADC map seq
uences. Images stored on PACS.



LIMITATIONS:  None.



FINDINGS:  ANATOMY: No developmental anomalies. Normal vascular flow voids. Pituitary fossa normal.

CSF SPACES: Over the left frontoparietal convexity, a 6-7 mm thick subdural hemorrhage is present.  T
his is isointense to brain on T1 weighted images and mixed signal on T2, either acute or early subacu
te, best shown on axial image 18,  coronal image 22 and sagittal image 19.

CEREBRUM: Sulci and gyri normal in size and contour. Normal white matter signal on FLAIR imaging.  No
 evidence of hemorrhage, mass, or extraaxial fluid collection.

POSTERIOR FOSSA: No signal alteration. No hemorrhage. No edema, masses or mass effect.  Internal ana
tory canals, cerebello-pontine angles, mastoids normal.

DIFFUSION IMAGING: Negative for acute or sub-acute infarction.

ORBITS: No masses. Globes normal.

PARANASAL  SINUSES: No fluid levels.  Mucosa normal.

OTHER: No other significant finding.



IMPRESSION:  Acute or early subacute subdural hemorrhage over the left frontoparietal convexity, 6 to
 7 mm in greatest thickness.  No significant mass effect or midline shift.



COMMENT:   Pertinent findings on the imaging study reported as a CRITICAL RESULT to CHARLOTTE DEL ANGEL MD at
12:23 on 7/10/2017.

Category of Critical Result: Left parietal/posterior frontal acute or early subacute subdural hemorrh
age



TECHNICAL DOCUMENTATION:  JOB ID:  [id number]

 [employer]- All Rights Reserved Podiatry aware of patient and will eval. Dr. Turner. Discussed case with PMD, Dr. Solsi who accepts patient for admission. Requests [facility].